# Patient Record
Sex: FEMALE | Race: BLACK OR AFRICAN AMERICAN | NOT HISPANIC OR LATINO | URBAN - METROPOLITAN AREA
[De-identification: names, ages, dates, MRNs, and addresses within clinical notes are randomized per-mention and may not be internally consistent; named-entity substitution may affect disease eponyms.]

---

## 2017-10-22 ENCOUNTER — EMERGENCY (EMERGENCY)
Facility: HOSPITAL | Age: 22
LOS: 1 days | Discharge: LEFT BEFORE DISCHARGE | End: 2017-10-22
Admitting: EMERGENCY MEDICINE

## 2017-10-22 ENCOUNTER — EMERGENCY (EMERGENCY)
Facility: HOSPITAL | Age: 22
LOS: 1 days | Discharge: HOME | End: 2017-10-22
Attending: EMERGENCY MEDICINE | Admitting: EMERGENCY MEDICINE
Payer: MEDICAID

## 2017-10-22 VITALS
RESPIRATION RATE: 16 BRPM | OXYGEN SATURATION: 100 % | SYSTOLIC BLOOD PRESSURE: 125 MMHG | DIASTOLIC BLOOD PRESSURE: 62 MMHG | HEART RATE: 75 BPM

## 2017-10-22 VITALS — HEART RATE: 63 BPM | RESPIRATION RATE: 16 BRPM | DIASTOLIC BLOOD PRESSURE: 60 MMHG | SYSTOLIC BLOOD PRESSURE: 131 MMHG

## 2017-10-22 VITALS
RESPIRATION RATE: 16 BRPM | TEMPERATURE: 98 F | SYSTOLIC BLOOD PRESSURE: 114 MMHG | OXYGEN SATURATION: 100 % | HEART RATE: 104 BPM | DIASTOLIC BLOOD PRESSURE: 60 MMHG

## 2017-10-22 LAB
ALBUMIN SERPL ELPH-MCNC: 4.5 G/DL — SIGNIFICANT CHANGE UP (ref 3.3–5)
ALP SERPL-CCNC: 68 U/L — SIGNIFICANT CHANGE UP (ref 40–120)
ALT FLD-CCNC: 10 U/L — SIGNIFICANT CHANGE UP (ref 4–33)
APPEARANCE UR: SIGNIFICANT CHANGE UP
AST SERPL-CCNC: 13 U/L — SIGNIFICANT CHANGE UP (ref 4–32)
BASOPHILS # BLD AUTO: 0.02 K/UL — SIGNIFICANT CHANGE UP (ref 0–0.2)
BASOPHILS NFR BLD AUTO: 0.1 % — SIGNIFICANT CHANGE UP (ref 0–2)
BILIRUB SERPL-MCNC: 0.5 MG/DL — SIGNIFICANT CHANGE UP (ref 0.2–1.2)
BILIRUB UR-MCNC: NEGATIVE — SIGNIFICANT CHANGE UP
BLOOD UR QL VISUAL: HIGH
BUN SERPL-MCNC: 8 MG/DL — SIGNIFICANT CHANGE UP (ref 7–23)
CALCIUM SERPL-MCNC: 8.6 MG/DL — SIGNIFICANT CHANGE UP (ref 8.4–10.5)
CHLORIDE SERPL-SCNC: 100 MMOL/L — SIGNIFICANT CHANGE UP (ref 98–107)
CO2 SERPL-SCNC: 24 MMOL/L — SIGNIFICANT CHANGE UP (ref 22–31)
COLOR SPEC: YELLOW — SIGNIFICANT CHANGE UP
CREAT SERPL-MCNC: 1.04 MG/DL — SIGNIFICANT CHANGE UP (ref 0.5–1.3)
EOSINOPHIL # BLD AUTO: 0.05 K/UL — SIGNIFICANT CHANGE UP (ref 0–0.5)
EOSINOPHIL NFR BLD AUTO: 0.3 % — SIGNIFICANT CHANGE UP (ref 0–6)
GLUCOSE SERPL-MCNC: 102 MG/DL — HIGH (ref 70–99)
GLUCOSE UR-MCNC: NEGATIVE — SIGNIFICANT CHANGE UP
HCT VFR BLD CALC: 37.3 % — SIGNIFICANT CHANGE UP (ref 34.5–45)
HGB BLD-MCNC: 12.4 G/DL — SIGNIFICANT CHANGE UP (ref 11.5–15.5)
HIV1 AG SER QL: SIGNIFICANT CHANGE UP
HIV1+2 AB SPEC QL: SIGNIFICANT CHANGE UP
IMM GRANULOCYTES # BLD AUTO: 0.06 # — SIGNIFICANT CHANGE UP
IMM GRANULOCYTES NFR BLD AUTO: 0.4 % — SIGNIFICANT CHANGE UP (ref 0–1.5)
KETONES UR-MCNC: NEGATIVE — SIGNIFICANT CHANGE UP
LEUKOCYTE ESTERASE UR-ACNC: HIGH
LIDOCAIN IGE QN: 17.3 U/L — SIGNIFICANT CHANGE UP (ref 7–60)
LYMPHOCYTES # BLD AUTO: 0.74 K/UL — LOW (ref 1–3.3)
LYMPHOCYTES # BLD AUTO: 4.8 % — LOW (ref 13–44)
MCHC RBC-ENTMCNC: 29.4 PG — SIGNIFICANT CHANGE UP (ref 27–34)
MCHC RBC-ENTMCNC: 33.2 % — SIGNIFICANT CHANGE UP (ref 32–36)
MCV RBC AUTO: 88.4 FL — SIGNIFICANT CHANGE UP (ref 80–100)
MONOCYTES # BLD AUTO: 0.62 K/UL — SIGNIFICANT CHANGE UP (ref 0–0.9)
MONOCYTES NFR BLD AUTO: 4 % — SIGNIFICANT CHANGE UP (ref 2–14)
MUCOUS THREADS # UR AUTO: SIGNIFICANT CHANGE UP
NEUTROPHILS # BLD AUTO: 13.96 K/UL — HIGH (ref 1.8–7.4)
NEUTROPHILS NFR BLD AUTO: 90.4 % — HIGH (ref 43–77)
NITRITE UR-MCNC: NEGATIVE — SIGNIFICANT CHANGE UP
NRBC # FLD: 0 — SIGNIFICANT CHANGE UP
PH UR: 6 — SIGNIFICANT CHANGE UP (ref 4.6–8)
PLATELET # BLD AUTO: 236 K/UL — SIGNIFICANT CHANGE UP (ref 150–400)
PMV BLD: 11.4 FL — SIGNIFICANT CHANGE UP (ref 7–13)
POTASSIUM SERPL-MCNC: 3.2 MMOL/L — LOW (ref 3.5–5.3)
POTASSIUM SERPL-SCNC: 3.2 MMOL/L — LOW (ref 3.5–5.3)
PROT SERPL-MCNC: 7.7 G/DL — SIGNIFICANT CHANGE UP (ref 6–8.3)
PROT UR-MCNC: 30 — HIGH
RBC # BLD: 4.22 M/UL — SIGNIFICANT CHANGE UP (ref 3.8–5.2)
RBC # FLD: 11.9 % — SIGNIFICANT CHANGE UP (ref 10.3–14.5)
RBC CASTS # UR COMP ASSIST: SIGNIFICANT CHANGE UP (ref 0–?)
SODIUM SERPL-SCNC: 139 MMOL/L — SIGNIFICANT CHANGE UP (ref 135–145)
SP GR SPEC: 1.03 — SIGNIFICANT CHANGE UP (ref 1–1.03)
SQUAMOUS # UR AUTO: SIGNIFICANT CHANGE UP
UROBILINOGEN FLD QL: NORMAL E.U. — SIGNIFICANT CHANGE UP (ref 0.1–0.2)
WBC # BLD: 15.45 K/UL — HIGH (ref 3.8–10.5)
WBC # FLD AUTO: 15.45 K/UL — HIGH (ref 3.8–10.5)
WBC UR QL: >50 — HIGH (ref 0–?)

## 2017-10-22 PROCEDURE — 76830 TRANSVAGINAL US NON-OB: CPT | Mod: 26

## 2017-10-22 PROCEDURE — 99284 EMERGENCY DEPT VISIT MOD MDM: CPT

## 2017-10-22 RX ORDER — METRONIDAZOLE 500 MG
500 TABLET ORAL ONCE
Qty: 0 | Refills: 0 | Status: COMPLETED | OUTPATIENT
Start: 2017-10-22 | End: 2017-10-22

## 2017-10-22 RX ORDER — METRONIDAZOLE 500 MG
1 TABLET ORAL
Qty: 27 | Refills: 0
Start: 2017-10-22 | End: 2017-11-05

## 2017-10-22 RX ORDER — ACETAMINOPHEN 500 MG
975 TABLET ORAL ONCE
Qty: 0 | Refills: 0 | Status: COMPLETED | OUTPATIENT
Start: 2017-10-22 | End: 2017-10-22

## 2017-10-22 RX ORDER — CEFTRIAXONE 500 MG/1
250 INJECTION, POWDER, FOR SOLUTION INTRAMUSCULAR; INTRAVENOUS ONCE
Qty: 0 | Refills: 0 | Status: COMPLETED | OUTPATIENT
Start: 2017-10-22 | End: 2017-10-22

## 2017-10-22 RX ADMIN — Medication 100 MILLIGRAM(S): at 08:06

## 2017-10-22 RX ADMIN — Medication 975 MILLIGRAM(S): at 06:58

## 2017-10-22 RX ADMIN — Medication 500 MILLIGRAM(S): at 08:07

## 2017-10-22 RX ADMIN — Medication 975 MILLIGRAM(S): at 08:10

## 2017-10-22 RX ADMIN — CEFTRIAXONE 250 MILLIGRAM(S): 500 INJECTION, POWDER, FOR SOLUTION INTRAMUSCULAR; INTRAVENOUS at 08:10

## 2017-10-22 NOTE — ED ADULT TRIAGE NOTE - CHIEF COMPLAINT QUOTE
Pt comes with complaints of  generalized abdominal pain which began last evening, denies N/V/D denies all PMH, LMP 9/10 pt states that she took plan B and "thinks period is messed up now, pt able to tolerate fluids but feels full when it comes to food, last BM, yesterday (normal) pt admits to marijuana use this evening

## 2017-10-22 NOTE — ED PROVIDER NOTE - PROGRESS NOTE DETAILS
MAGI Dumont: Received sign out from Dr. Riggins to f/u on TVUS and tx for PID/UTI. US wnl. Discussed results with patient. Ok with plan for GYN follow up.

## 2017-10-22 NOTE — ED ADULT NURSE NOTE - OBJECTIVE STATEMENT
Pt received in room 12, a/o x3. Pt comes in for c/o generalized non-radiation abd pain that began yesterday morning. Pt reporting possibilty of STD and Pt received in room 12, a/o x3. Pt comes in for c/o generalized non-radiation abd pain that began yesterday morning. Pt reporting possibility of STD vs UTI, and came in for increasing abd ban. Iv access placed to Lac 20 G. Pt currently awaiting furthers MD evaluation and US. Pt reports hx of BV, took BV cream for symptoms as well and medication cream for yeast infection, will monitor

## 2017-10-22 NOTE — ED PROVIDER NOTE - CARE PLAN
Principal Discharge DX:	PID (pelvic inflammatory disease)  Instructions for follow-up, activity and diet:	Follow up with your PMD within 48-72 hours. Follow up with your gyn this week or follow up in our clinic, call [phone] to make an appointment. Increase fluids. Motrin 600mg every 8 hours with food for pain. Take Flagyl 500mg twice a day and Doxycycline 100mg twice a day for 14 days- take full course of antibiotics. DO NOT DRINK ALCOHOL WHEN TAKING THESE MEDICATIONS. May call the Admin PA for you results from 9-3, 7 days a week, at [phone]. Worsening pain, new fever, chills, nausea, vomiting return to ER  Secondary Diagnosis:	UTI (urinary tract infection)

## 2017-10-22 NOTE — ED PROVIDER NOTE - ATTENDING CONTRIBUTION TO CARE
Pt was seen and evaluated by me. Pt states over the past 2 days having suprapubic abd pain with some dysuria and discharge. Pt denies any fever, chills, SOB, nausea, vomiting, or diarrhea. Pt admits to recurrent BV infections. Lungs CTA b/l. RRR. Abd soft with mild suprapubic tenderness.

## 2017-10-22 NOTE — ED PROVIDER NOTE - OBJECTIVE STATEMENT
23 y/o female w/ no pmh p/w abdominal pain. symptoms started yesterday, described as sharp, non-radiating, upper and lower abdomen, waxing and waning. no fever, chills, nausea or vomiting. Patient feels as if she is not empying her bladder completely. Recently diagnosed w/ BV and took flagyl and tirconazole for yeast infection  LMP sept 22nd 21 y/o female w/ no pmh p/w abdominal pain. symptoms started yesterday, described as sharp, non-radiating, upper and lower abdomen, waxing and waning. no fever, chills, nausea or vomiting. Patient feels as if she is not emptying her bladder completely. Recently diagnosed w/ BV and took flagyl and tirconazole for yeast infection  LMP sept 22nd 21 y/o female w/ no pmh p/w abdominal pain. symptoms started yesterday, described as sharp, non-radiating, upper and lower abdomen, waxing and waning. no fever, chills, nausea or vomiting. Patient feels as if she is not emptying her bladder completely. Recently diagnosed w/ BV and took flagyl and terconazole for yeast infection LMP sept 22nd

## 2017-10-22 NOTE — ED PROVIDER NOTE - PLAN OF CARE
Follow up with your PMD within 48-72 hours. Follow up with your gyn this week or follow up in our clinic, call [phone] to make an appointment. Increase fluids. Motrin 600mg every 8 hours with food for pain. Take Flagyl 500mg twice a day and Doxycycline 100mg twice a day for 14 days- take full course of antibiotics. DO NOT DRINK ALCOHOL WHEN TAKING THESE MEDICATIONS. May call the Admin PA for you results from 9-3, 7 days a week, at [phone]. Worsening pain, new fever, chills, nausea, vomiting return to ER

## 2017-10-23 LAB
BACTERIA UR CULT: SIGNIFICANT CHANGE UP
C TRACH RRNA SPEC QL NAA+PROBE: SIGNIFICANT CHANGE UP
N GONORRHOEA RRNA SPEC QL NAA+PROBE: DETECTED — SIGNIFICANT CHANGE UP
SPECIMEN SOURCE: SIGNIFICANT CHANGE UP
SPECIMEN SOURCE: SIGNIFICANT CHANGE UP

## 2017-10-24 NOTE — ED POST DISCHARGE NOTE - RESULT SUMMARY
GC: detected. Patient treated in ED with Ceftriaxone 250mg IM X 1 and Patient discharged home with a prescription for Doxycline. Patient contact # [phone] Msg left with Admin # and hrs. Plan discussed with patient positive GC, have patient follow up with GYN and notify all sexual partners.

## 2018-09-24 NOTE — ED ADULT NURSE NOTE - CINV DISCH TEACH PARTICIP
Partially impaired: cannot see medication labels or newsprint, but can see obstacles in path, and the surrounding layout; can count fingers at arm's length Patient

## 2019-01-08 ENCOUNTER — EMERGENCY (EMERGENCY)
Facility: HOSPITAL | Age: 24
LOS: 1 days | Discharge: HOME | End: 2019-01-08
Attending: EMERGENCY MEDICINE | Admitting: EMERGENCY MEDICINE
Payer: MEDICAID

## 2019-01-08 VITALS
DIASTOLIC BLOOD PRESSURE: 96 MMHG | SYSTOLIC BLOOD PRESSURE: 135 MMHG | HEART RATE: 90 BPM | RESPIRATION RATE: 16 BRPM | TEMPERATURE: 101 F | OXYGEN SATURATION: 96 %

## 2019-01-08 PROCEDURE — 99283 EMERGENCY DEPT VISIT LOW MDM: CPT

## 2019-01-08 RX ORDER — ACETAMINOPHEN 500 MG
650 TABLET ORAL ONCE
Qty: 0 | Refills: 0 | Status: COMPLETED | OUTPATIENT
Start: 2019-01-08 | End: 2019-01-08

## 2019-01-08 RX ORDER — ACETAMINOPHEN 500 MG
1 TABLET ORAL
Qty: 16 | Refills: 0
Start: 2019-01-08 | End: 2019-01-11

## 2019-01-08 RX ORDER — ONDANSETRON 8 MG/1
4 TABLET, FILM COATED ORAL ONCE
Qty: 0 | Refills: 0 | Status: COMPLETED | OUTPATIENT
Start: 2019-01-08 | End: 2019-01-08

## 2019-01-08 RX ORDER — IBUPROFEN 200 MG
600 TABLET ORAL ONCE
Qty: 0 | Refills: 0 | Status: DISCONTINUED | OUTPATIENT
Start: 2019-01-08 | End: 2019-01-12

## 2019-01-08 RX ADMIN — ONDANSETRON 4 MILLIGRAM(S): 8 TABLET, FILM COATED ORAL at 16:53

## 2019-01-08 RX ADMIN — Medication 650 MILLIGRAM(S): at 16:53

## 2019-01-08 NOTE — ED PROVIDER NOTE - NS ED ROS FT
General: + fevers  HENT: + nasal congestion, sore throat, rhinorrhea  Eyes: denies photophobia  Neck: denies neck stiffness  CV: denies chest pain  Resp: denies difficulty breathing, + cough  Abdominal: + nausea, + vomiting, denies abdominal pain  MSK: + generalized body aches  Neuro: denies headaches

## 2019-01-08 NOTE — ED PROVIDER NOTE - MEDICAL DECISION MAKING DETAILS
23F w/ no PMH p/w viral illness, no nuchal rigidity, no neuro deficits, low suspicion for meningitis, no findings on lung auscultation, low suspicion for PNA, no focal abdominal findings, low suspicion for acute abdomen, no dysuria, low suspicion for UTI, will treat symptomatically

## 2019-01-08 NOTE — ED PROVIDER NOTE - ATTENDING CONTRIBUTION TO CARE
23F w/ no PMH p/w 2 days of fever, sore throat, URI symptoms, nausea, NBNB vomiting x1, gradual onset headache, generalized body aches, 23F w/ no PMH p/w 2 days of fever, sore throat, clear rhinorrhea, nasl congestion, nausea, vomiting x1, gradual onset headache, generalized body aches. headche resolves with Antipyretic's, rated mild at this time.  Dry cough. Denies any chest pain or shortness of breath.  Subjective fevers. No measured temperature. No neck pain, no neck stiffness, no photophobia, no abdominal pain, no urinary complaints, no rash.  PE  well appearing, non-toxic,  oropharynx erythematous without tonsillar or enlargement. No tender cervical adenopathy.  Lungs CTA. Abdomen soft and nontender palpation. No rash. Entirely neuro- intact.    s/s  Most consistent with viral upper respiratory infection. Lungs are clear to auscultation, oxygen sats normal, nonproductive cough, no s/s  or HPI findings to suggest pneumonia at this time.  no indication for chest x-ray at this time. no s/s Of meningitis. Patient was given Tylenol, ibuprofen, and Zofran in the ER. On patient reevaluation  patient reported feeling significantly better. Eating and drinking without any vomiting. Ambulating around the ER.  Patient is outside of the treatment window for influenza. Patient was discharged with instructions to  Drink plenty of fluids, Motrin and Tylenol for fever , and follow up with PMD in 1-2 days for repeat evaluation and further management.    I reviewed all results from this ED visit, and discussed ALL results with the patient and/or family, including all abnormal results and incidental findings. All questions/concerns were addressed, and I recommended appropriate follow up for all findings. All discharge instructions were thoroughly discussed with the patient and/or family, as well as important warning signs and new/ worsening symptoms which should necessitate patient's immediate return to the ED. The patient expressed understanding of all results discussed and follow up instructions given.The patient was agreeable with discharge and was discharged from the ED in stable condition.

## 2019-01-08 NOTE — ED ADULT TRIAGE NOTE - CHIEF COMPLAINT QUOTE
Patient c/o cough, sneezing, nausea, chest pain, body aches and fever since yesterday. Febrile in triage = 101.3

## 2019-01-08 NOTE — ED PROVIDER NOTE - OBJECTIVE STATEMENT
23F w/ no PMH p/w 2 days of fever, sore throat, URI symptoms, nausea, NBNB vomiting x1, gradual onset headache, generalized body aches, and decreased PO intake. Denies neck stiffness, photophobia, focal abdominal pain, productive cough, dysuria, vaginal discharge.

## 2019-01-08 NOTE — ED PROVIDER NOTE - NSFOLLOWUPINSTRUCTIONS_ED_ALL_ED_FT
You were seen for fever, body aches, headache, nausea, and other symptoms of a viral illness. At this time it does not appear you have an acute emergent infection. Please remember to stay hydrated. Take Tylenol every 6 hours as prescribed. Follow up with your primary care doctor in 3-4 days for follow up. Return to the ED if you have worsening symptoms, are unable to eat or drink, have worsening headache, have neck stiffness, worsening abdominal pain, or other new symptoms

## 2019-01-08 NOTE — ED PROVIDER NOTE - PHYSICAL EXAMINATION
CONSTITUTIONAL: awake, alert, no acute respiratory distress  HEAD: Normocephalic; atraumatic  EYES: EOMI, no nystagmus  ENMT: External appears normal; no tonsillar exudates, no pharyngeal erythema  NECK: Supple; no nuchal rigidity,   CARD: Normal Sl, S2; no audible murmurs  RESP: Breathing comfortably on RA, ctab, no rales, no crackles   ABD: Soft, non-distended; no focal tenderness  EXT: No pedal edema  SKIN: Warm, dry, no rashes  NEURO: aaox3, moving all extremities spontaneously

## 2019-09-08 ENCOUNTER — EMERGENCY (EMERGENCY)
Facility: HOSPITAL | Age: 24
LOS: 1 days | Discharge: HOME | End: 2019-09-08
Attending: EMERGENCY MEDICINE | Admitting: EMERGENCY MEDICINE
Payer: MEDICAID

## 2019-09-08 VITALS
SYSTOLIC BLOOD PRESSURE: 132 MMHG | TEMPERATURE: 98 F | DIASTOLIC BLOOD PRESSURE: 68 MMHG | OXYGEN SATURATION: 99 % | RESPIRATION RATE: 17 BRPM | HEART RATE: 61 BPM

## 2019-09-08 VITALS
SYSTOLIC BLOOD PRESSURE: 131 MMHG | TEMPERATURE: 98 F | OXYGEN SATURATION: 100 % | HEART RATE: 56 BPM | RESPIRATION RATE: 16 BRPM | DIASTOLIC BLOOD PRESSURE: 77 MMHG

## 2019-09-08 LAB
ALBUMIN SERPL ELPH-MCNC: 4.6 G/DL — SIGNIFICANT CHANGE UP (ref 3.3–5)
ALP SERPL-CCNC: 62 U/L — SIGNIFICANT CHANGE UP (ref 40–120)
ALT FLD-CCNC: 11 U/L — SIGNIFICANT CHANGE UP (ref 4–33)
ANION GAP SERPL CALC-SCNC: 11 MMO/L — SIGNIFICANT CHANGE UP (ref 7–14)
APPEARANCE UR: CLEAR — SIGNIFICANT CHANGE UP
AST SERPL-CCNC: 18 U/L — SIGNIFICANT CHANGE UP (ref 4–32)
BACTERIA # UR AUTO: NEGATIVE — SIGNIFICANT CHANGE UP
BASOPHILS # BLD AUTO: 0.03 K/UL — SIGNIFICANT CHANGE UP (ref 0–0.2)
BASOPHILS NFR BLD AUTO: 0.6 % — SIGNIFICANT CHANGE UP (ref 0–2)
BILIRUB SERPL-MCNC: 0.2 MG/DL — SIGNIFICANT CHANGE UP (ref 0.2–1.2)
BILIRUB UR-MCNC: NEGATIVE — SIGNIFICANT CHANGE UP
BLOOD UR QL VISUAL: HIGH
BUN SERPL-MCNC: 13 MG/DL — SIGNIFICANT CHANGE UP (ref 7–23)
CALCIUM SERPL-MCNC: 9.4 MG/DL — SIGNIFICANT CHANGE UP (ref 8.4–10.5)
CHLORIDE SERPL-SCNC: 104 MMOL/L — SIGNIFICANT CHANGE UP (ref 98–107)
CO2 SERPL-SCNC: 25 MMOL/L — SIGNIFICANT CHANGE UP (ref 22–31)
COLOR SPEC: SIGNIFICANT CHANGE UP
CREAT SERPL-MCNC: 0.91 MG/DL — SIGNIFICANT CHANGE UP (ref 0.5–1.3)
EOSINOPHIL # BLD AUTO: 0.06 K/UL — SIGNIFICANT CHANGE UP (ref 0–0.5)
EOSINOPHIL NFR BLD AUTO: 1.1 % — SIGNIFICANT CHANGE UP (ref 0–6)
GLUCOSE SERPL-MCNC: 86 MG/DL — SIGNIFICANT CHANGE UP (ref 70–99)
GLUCOSE UR-MCNC: NEGATIVE — SIGNIFICANT CHANGE UP
HCT VFR BLD CALC: 37 % — SIGNIFICANT CHANGE UP (ref 34.5–45)
HGB BLD-MCNC: 12.1 G/DL — SIGNIFICANT CHANGE UP (ref 11.5–15.5)
HIV COMBO RESULT: SIGNIFICANT CHANGE UP
HIV1+2 AB SPEC QL: SIGNIFICANT CHANGE UP
HYALINE CASTS # UR AUTO: NEGATIVE — SIGNIFICANT CHANGE UP
IMM GRANULOCYTES NFR BLD AUTO: 0.2 % — SIGNIFICANT CHANGE UP (ref 0–1.5)
KETONES UR-MCNC: NEGATIVE — SIGNIFICANT CHANGE UP
LEUKOCYTE ESTERASE UR-ACNC: NEGATIVE — SIGNIFICANT CHANGE UP
LIDOCAIN IGE QN: 26.6 U/L — SIGNIFICANT CHANGE UP (ref 7–60)
LYMPHOCYTES # BLD AUTO: 2.24 K/UL — SIGNIFICANT CHANGE UP (ref 1–3.3)
LYMPHOCYTES # BLD AUTO: 41.9 % — SIGNIFICANT CHANGE UP (ref 13–44)
MCHC RBC-ENTMCNC: 29.2 PG — SIGNIFICANT CHANGE UP (ref 27–34)
MCHC RBC-ENTMCNC: 32.7 % — SIGNIFICANT CHANGE UP (ref 32–36)
MCV RBC AUTO: 89.2 FL — SIGNIFICANT CHANGE UP (ref 80–100)
MONOCYTES # BLD AUTO: 0.62 K/UL — SIGNIFICANT CHANGE UP (ref 0–0.9)
MONOCYTES NFR BLD AUTO: 11.6 % — SIGNIFICANT CHANGE UP (ref 2–14)
NEUTROPHILS # BLD AUTO: 2.39 K/UL — SIGNIFICANT CHANGE UP (ref 1.8–7.4)
NEUTROPHILS NFR BLD AUTO: 44.6 % — SIGNIFICANT CHANGE UP (ref 43–77)
NITRITE UR-MCNC: NEGATIVE — SIGNIFICANT CHANGE UP
NRBC # FLD: 0 K/UL — SIGNIFICANT CHANGE UP (ref 0–0)
PH UR: 6.5 — SIGNIFICANT CHANGE UP (ref 5–8)
PLATELET # BLD AUTO: 282 K/UL — SIGNIFICANT CHANGE UP (ref 150–400)
PMV BLD: 10.4 FL — SIGNIFICANT CHANGE UP (ref 7–13)
POTASSIUM SERPL-MCNC: 4.4 MMOL/L — SIGNIFICANT CHANGE UP (ref 3.5–5.3)
POTASSIUM SERPL-SCNC: 4.4 MMOL/L — SIGNIFICANT CHANGE UP (ref 3.5–5.3)
PROT SERPL-MCNC: 7.6 G/DL — SIGNIFICANT CHANGE UP (ref 6–8.3)
PROT UR-MCNC: 30 — SIGNIFICANT CHANGE UP
RBC # BLD: 4.15 M/UL — SIGNIFICANT CHANGE UP (ref 3.8–5.2)
RBC # FLD: 11.6 % — SIGNIFICANT CHANGE UP (ref 10.3–14.5)
RBC CASTS # UR COMP ASSIST: >50 — HIGH (ref 0–?)
SODIUM SERPL-SCNC: 140 MMOL/L — SIGNIFICANT CHANGE UP (ref 135–145)
SP GR SPEC: 1.02 — SIGNIFICANT CHANGE UP (ref 1–1.04)
SQUAMOUS # UR AUTO: SIGNIFICANT CHANGE UP
UROBILINOGEN FLD QL: NORMAL — SIGNIFICANT CHANGE UP
WBC # BLD: 5.35 K/UL — SIGNIFICANT CHANGE UP (ref 3.8–10.5)
WBC # FLD AUTO: 5.35 K/UL — SIGNIFICANT CHANGE UP (ref 3.8–10.5)
WBC UR QL: SIGNIFICANT CHANGE UP (ref 0–?)

## 2019-09-08 PROCEDURE — 99283 EMERGENCY DEPT VISIT LOW MDM: CPT

## 2019-09-08 RX ORDER — FLUCONAZOLE 150 MG/1
150 TABLET ORAL ONCE
Refills: 0 | Status: COMPLETED | OUTPATIENT
Start: 2019-09-08 | End: 2019-09-08

## 2019-09-08 RX ORDER — ACETAMINOPHEN 500 MG
650 TABLET ORAL ONCE
Refills: 0 | Status: COMPLETED | OUTPATIENT
Start: 2019-09-08 | End: 2019-09-08

## 2019-09-08 RX ADMIN — FLUCONAZOLE 150 MILLIGRAM(S): 150 TABLET ORAL at 21:19

## 2019-09-08 RX ADMIN — Medication 650 MILLIGRAM(S): at 19:23

## 2019-09-08 NOTE — ED ADULT NURSE REASSESSMENT NOTE - NS ED NURSE REASSESS COMMENT FT1
received pt A&Ox3 absent any distress or C/O pain. able to make needs know with care provided PRN. will continue with current plan of care PT went to [employer] to get food. PT appears comfortable and in no acute distress.

## 2019-09-08 NOTE — ED ADULT NURSE NOTE - OBJECTIVE STATEMENT
patient  Alert & ox3. patient complains of left side lower abdominal pain and urinary frequency.pt . evaluated by MD.Placed 20g angiocath Lt. AC., labs drawn and sent. patient will be waiting for results, further evaluation and disposition.  made comfortable.will continue to monitor.
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
## 2019-09-08 NOTE — ED PROVIDER NOTE - NSFOLLOWUPINSTRUCTIONS_ED_ALL_ED_FT
FOLLOW UP WITH YOUR  DOCTOR WITHIN 1 WEEK  BRING THE COPIES OF YOUR RESULTS WITH YOU (PROVIDED)  CAN TAKE TYLENOL 650MG ORALLY EVERY 6 HOURS FOR PAIN OR FEVER.  IBUPROFEN 400MG ORALLY EVERY 6 HOURS FOR PAIN OR FEVER.    CAN TAKE TYLENOL AND IBUPROFEN AT THE SAME TIME  RETURN TO ED FOR NEW OR WORSENING SYMPTOMS.    Follow up with [facility] - HAND clinic - [phone].  Or follow up with your Medical Doctor for referral.

## 2019-09-08 NOTE — ED PROVIDER NOTE - PHYSICAL EXAMINATION
VS:  unremarkable    GEN - NAD; well appearing; A+O x3   HEAD - NC/AT   healing forehead sutured lac (no sutures remain).  ENT - PEERL, EOMI, mucous membranes  moist , no discharge      NECK: Neck supple, non-tender without lymphadenopathy, no masses, no JVD  PULM - CTA b/l,  symmetric breath sounds  COR -  normal heart sounds    ABD - , ND, NT, soft,  pelvic exam - chap RN Ellie - normal ext genitalia.  No discharge.  small amount of blood in vaginal canal.  Os closed.  No adnexal ttp.   BACK - no CVA tenderness, nontender spine     EXTREMS - no edema, no deformity, warm and well perfused    SKIN - no rash or bruising      NEUROLOGIC - alert, CN 2-12 intact, sensation nl, motor no focal deficit.

## 2019-09-08 NOTE — ED PROVIDER NOTE - PATIENT PORTAL LINK FT
You can access the FollowMyHealth Patient Portal offered by [facility] by registering at the following website: [email or web address]. By joining [employer]’s FollowMyHealth portal, you will also be able to view your health information using other applications (apps) compatible with our system.
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
## 2019-09-08 NOTE — ED PROVIDER NOTE - NS ED ROS FT
L hand pain  pelvic irritation  vaginal bleeding  Urinary frequency  all other ROS negative except as per HPI

## 2019-09-09 LAB
C TRACH RRNA SPEC QL NAA+PROBE: SIGNIFICANT CHANGE UP
N GONORRHOEA RRNA SPEC QL NAA+PROBE: SIGNIFICANT CHANGE UP
SPECIMEN SOURCE: SIGNIFICANT CHANGE UP

## 2019-09-10 DIAGNOSIS — Z98.890 OTHER SPECIFIED POSTPROCEDURAL STATES: Chronic | ICD-10-CM

## 2019-09-10 LAB
BACTERIA UR CULT: SIGNIFICANT CHANGE UP
SPECIMEN SOURCE: SIGNIFICANT CHANGE UP

## 2019-10-31 ENCOUNTER — EMERGENCY (EMERGENCY)
Facility: HOSPITAL | Age: 24
LOS: 1 days | Discharge: HOME | End: 2019-10-31
Attending: EMERGENCY MEDICINE | Admitting: EMERGENCY MEDICINE
Payer: COMMERCIAL

## 2019-10-31 VITALS
DIASTOLIC BLOOD PRESSURE: 55 MMHG | TEMPERATURE: 98 F | RESPIRATION RATE: 16 BRPM | OXYGEN SATURATION: 100 % | HEART RATE: 70 BPM | SYSTOLIC BLOOD PRESSURE: 112 MMHG

## 2019-10-31 DIAGNOSIS — Z98.890 OTHER SPECIFIED POSTPROCEDURAL STATES: Chronic | ICD-10-CM

## 2019-10-31 PROCEDURE — 99283 EMERGENCY DEPT VISIT LOW MDM: CPT

## 2019-10-31 RX ORDER — IBUPROFEN 200 MG
1 TABLET ORAL
Qty: 16 | Refills: 0
Start: 2019-10-31 | End: 2019-11-03

## 2019-10-31 RX ORDER — ACETAMINOPHEN 500 MG
650 TABLET ORAL ONCE
Refills: 0 | Status: COMPLETED | OUTPATIENT
Start: 2019-10-31 | End: 2019-10-31

## 2019-10-31 RX ORDER — IBUPROFEN 200 MG
600 TABLET ORAL ONCE
Refills: 0 | Status: COMPLETED | OUTPATIENT
Start: 2019-10-31 | End: 2019-10-31

## 2019-10-31 RX ORDER — LIDOCAINE 4 G/100G
1 CREAM TOPICAL ONCE
Refills: 0 | Status: COMPLETED | OUTPATIENT
Start: 2019-10-31 | End: 2019-10-31

## 2019-10-31 RX ORDER — CYCLOBENZAPRINE HYDROCHLORIDE 10 MG/1
1 TABLET, FILM COATED ORAL
Qty: 9 | Refills: 0
Start: 2019-10-31 | End: 2019-11-02

## 2019-10-31 RX ORDER — DIAZEPAM 5 MG
5 TABLET ORAL ONCE
Refills: 0 | Status: DISCONTINUED | OUTPATIENT
Start: 2019-10-31 | End: 2019-10-31

## 2019-10-31 RX ADMIN — Medication 5 MILLIGRAM(S): at 19:40

## 2019-10-31 RX ADMIN — Medication 650 MILLIGRAM(S): at 19:41

## 2019-10-31 RX ADMIN — Medication 600 MILLIGRAM(S): at 19:41

## 2019-10-31 RX ADMIN — LIDOCAINE 1 PATCH: 4 CREAM TOPICAL at 19:41

## 2019-10-31 NOTE — ED PROVIDER NOTE - NSFOLLOWUPINSTRUCTIONS_ED_ALL_ED_FT
Patient advised to follow up with PRIMARY CARE DOCTOR IN 1-2 DAYS AND SPINE SPECIALIST AS NEEDED  and told to return to the emergency department immediately for any new or concerning symptoms  OR ANY OTHER COMPLAINTS. Patient agrees with plan.    Rest, avoid heavy lifting/strenuous activity   Take ibuprofen 600 mg every 6 hrs as needed for pain   Take muscle relaxer as directed- no driving, drinking alcohol or operating machinery while taking      Advance activity as tolerated.  Continue all previously prescribed medications as directed unless otherwise instructed.  Follow up with your primary care physician in 48-72 hours- bring copies of your results.  Return to the ER for worsening or persistent symptoms, and/or ANY NEW OR CONCERNING SYMPTOMS. If you have issues obtaining follow up, please call: [phone] to obtain a doctor or specialist who takes your insurance in your area.  You may call [phone] to make an appointment with the internal medicine clinic.

## 2019-10-31 NOTE — ED PROVIDER NOTE - PATIENT PORTAL LINK FT
You can access the FollowMyHealth Patient Portal offered by [facility] by registering at the following website: [email or web address]. By joining [employer]’s FollowMyHealth portal, you will also be able to view your health information using other applications (apps) compatible with our system.
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
## 2019-10-31 NOTE — ED PROVIDER NOTE - PROGRESS NOTE DETAILS
PA De La Paz- Patient seen, evaluated and plan started by MD, Pt given meds with good relief, ambulatory in ED, feeling better and eager to be dc home. Pt given RICE instructions and IBU and muscle relaxer and advised to f/u with pcp 1-2 days or spine if continued sx. All questions and concerns addressed. Strict return instructions given.
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
## 2021-06-21 ENCOUNTER — EMERGENCY (EMERGENCY)
Facility: HOSPITAL | Age: 26
LOS: 1 days | Discharge: STILL ADMITTED OR ADMINISTRATIVE | End: 2021-06-21
Admitting: EMERGENCY MEDICINE
Payer: COMMERCIAL

## 2021-06-21 ENCOUNTER — OUTPATIENT (OUTPATIENT)
Dept: INPATIENT UNIT | Facility: HOSPITAL | Age: 26
LOS: 1 days | Discharge: HOME | End: 2021-06-21

## 2021-06-21 VITALS
TEMPERATURE: 98 F | OXYGEN SATURATION: 99 % | SYSTOLIC BLOOD PRESSURE: 126 MMHG | DIASTOLIC BLOOD PRESSURE: 82 MMHG | HEART RATE: 82 BPM | HEIGHT: 62 IN | RESPIRATION RATE: 18 BRPM

## 2021-06-21 DIAGNOSIS — Z98.890 OTHER SPECIFIED POSTPROCEDURAL STATES: Chronic | ICD-10-CM

## 2021-06-21 DIAGNOSIS — Z3A.00 WEEKS OF GESTATION OF PREGNANCY NOT SPECIFIED: ICD-10-CM

## 2021-06-21 DIAGNOSIS — O26.899 OTHER SPECIFIED PREGNANCY RELATED CONDITIONS, UNSPECIFIED TRIMESTER: ICD-10-CM

## 2021-06-21 PROCEDURE — L9993: CPT

## 2021-06-21 NOTE — ED ADULT TRIAGE NOTE - CHIEF COMPLAINT QUOTE
Pt. states she is 7 months pregnant, due on 8/26. C/o right sided abd pain, pelvic pain and nausea that began 4 hours ago. Denies vaginal bleeding/discharge. Spoke to Yue from L&D who states pt. to be seen in L&D.

## 2021-06-21 NOTE — ED ADULT NURSE NOTE - NS ED NURSE NOTE DISPO AOU DETAILS FT
Pt. A&Ox4, in no acute distress. Respirations even & unlabored. Spoke to Yue from L&D who states pt. to be seen in L&D, will be transported by ED tech.

## 2021-06-22 NOTE — OB RN TRIAGE NOTE - NS_TRIAGEADDITIONAL COMMENTS_OBGYN_ALL_OB_FT
Pt left with out being seen in [facility]. Pt does not want to wait any longer.

## 2021-07-19 ENCOUNTER — OUTPATIENT (OUTPATIENT)
Dept: OUTPATIENT SERVICES | Facility: HOSPITAL | Age: 26
LOS: 1 days | End: 2021-07-19
Payer: MEDICAID

## 2021-07-19 VITALS — DIASTOLIC BLOOD PRESSURE: 84 MMHG | HEART RATE: 64 BPM | TEMPERATURE: 99 F | SYSTOLIC BLOOD PRESSURE: 144 MMHG

## 2021-07-19 DIAGNOSIS — Z3A.00 WEEKS OF GESTATION OF PREGNANCY NOT SPECIFIED: ICD-10-CM

## 2021-07-19 DIAGNOSIS — O26.899 OTHER SPECIFIED PREGNANCY RELATED CONDITIONS, UNSPECIFIED TRIMESTER: ICD-10-CM

## 2021-07-19 DIAGNOSIS — Z98.890 OTHER SPECIFIED POSTPROCEDURAL STATES: Chronic | ICD-10-CM

## 2021-07-19 LAB
ALBUMIN SERPL ELPH-MCNC: 3.8 G/DL — SIGNIFICANT CHANGE UP (ref 3.3–5)
ALP SERPL-CCNC: 114 U/L — SIGNIFICANT CHANGE UP (ref 40–120)
ALT FLD-CCNC: 30 U/L — SIGNIFICANT CHANGE UP (ref 10–45)
ANION GAP SERPL CALC-SCNC: 13 MMOL/L — SIGNIFICANT CHANGE UP (ref 5–17)
APPEARANCE UR: CLEAR — SIGNIFICANT CHANGE UP
APTT BLD: 28.9 SEC — SIGNIFICANT CHANGE UP (ref 27.5–35.5)
AST SERPL-CCNC: 29 U/L — SIGNIFICANT CHANGE UP (ref 10–40)
BASOPHILS # BLD AUTO: 0.03 K/UL — SIGNIFICANT CHANGE UP (ref 0–0.2)
BASOPHILS NFR BLD AUTO: 0.5 % — SIGNIFICANT CHANGE UP (ref 0–2)
BILIRUB SERPL-MCNC: 0.2 MG/DL — SIGNIFICANT CHANGE UP (ref 0.2–1.2)
BILIRUB UR-MCNC: NEGATIVE — SIGNIFICANT CHANGE UP
BLD GP AB SCN SERPL QL: NEGATIVE — SIGNIFICANT CHANGE UP
BUN SERPL-MCNC: 9 MG/DL — SIGNIFICANT CHANGE UP (ref 7–23)
CALCIUM SERPL-MCNC: 9.3 MG/DL — SIGNIFICANT CHANGE UP (ref 8.4–10.5)
CHLORIDE SERPL-SCNC: 105 MMOL/L — SIGNIFICANT CHANGE UP (ref 96–108)
CO2 SERPL-SCNC: 21 MMOL/L — LOW (ref 22–31)
COLOR SPEC: SIGNIFICANT CHANGE UP
CREAT SERPL-MCNC: 0.7 MG/DL — SIGNIFICANT CHANGE UP (ref 0.5–1.3)
DIFF PNL FLD: NEGATIVE — SIGNIFICANT CHANGE UP
EOSINOPHIL # BLD AUTO: 0.05 K/UL — SIGNIFICANT CHANGE UP (ref 0–0.5)
EOSINOPHIL NFR BLD AUTO: 0.9 % — SIGNIFICANT CHANGE UP (ref 0–6)
FIBRINOGEN PPP-MCNC: 860 MG/DL — HIGH (ref 290–520)
GLUCOSE SERPL-MCNC: 72 MG/DL — SIGNIFICANT CHANGE UP (ref 70–99)
GLUCOSE UR QL: NEGATIVE — SIGNIFICANT CHANGE UP
HCT VFR BLD CALC: 32.2 % — LOW (ref 34.5–45)
HGB BLD-MCNC: 10.8 G/DL — LOW (ref 11.5–15.5)
IMM GRANULOCYTES NFR BLD AUTO: 0.5 % — SIGNIFICANT CHANGE UP (ref 0–1.5)
INR BLD: 0.94 RATIO — SIGNIFICANT CHANGE UP (ref 0.88–1.16)
KETONES UR-MCNC: NEGATIVE — SIGNIFICANT CHANGE UP
LDH SERPL L TO P-CCNC: 228 U/L — SIGNIFICANT CHANGE UP (ref 50–242)
LEUKOCYTE ESTERASE UR-ACNC: NEGATIVE — SIGNIFICANT CHANGE UP
LYMPHOCYTES # BLD AUTO: 1.04 K/UL — SIGNIFICANT CHANGE UP (ref 1–3.3)
LYMPHOCYTES # BLD AUTO: 18.9 % — SIGNIFICANT CHANGE UP (ref 13–44)
MCHC RBC-ENTMCNC: 29.5 PG — SIGNIFICANT CHANGE UP (ref 27–34)
MCHC RBC-ENTMCNC: 33.5 GM/DL — SIGNIFICANT CHANGE UP (ref 32–36)
MCV RBC AUTO: 88 FL — SIGNIFICANT CHANGE UP (ref 80–100)
MONOCYTES # BLD AUTO: 0.48 K/UL — SIGNIFICANT CHANGE UP (ref 0–0.9)
MONOCYTES NFR BLD AUTO: 8.7 % — SIGNIFICANT CHANGE UP (ref 2–14)
NEUTROPHILS # BLD AUTO: 3.88 K/UL — SIGNIFICANT CHANGE UP (ref 1.8–7.4)
NEUTROPHILS NFR BLD AUTO: 70.5 % — SIGNIFICANT CHANGE UP (ref 43–77)
NITRITE UR-MCNC: NEGATIVE — SIGNIFICANT CHANGE UP
NRBC # BLD: 0 /100 WBCS — SIGNIFICANT CHANGE UP (ref 0–0)
PH UR: 6.5 — SIGNIFICANT CHANGE UP (ref 5–8)
PLATELET # BLD AUTO: 249 K/UL — SIGNIFICANT CHANGE UP (ref 150–400)
POTASSIUM SERPL-MCNC: 4 MMOL/L — SIGNIFICANT CHANGE UP (ref 3.5–5.3)
POTASSIUM SERPL-SCNC: 4 MMOL/L — SIGNIFICANT CHANGE UP (ref 3.5–5.3)
PROT SERPL-MCNC: 6.7 G/DL — SIGNIFICANT CHANGE UP (ref 6–8.3)
PROT UR-MCNC: NEGATIVE — SIGNIFICANT CHANGE UP
PROTHROM AB SERPL-ACNC: 11.3 SEC — SIGNIFICANT CHANGE UP (ref 10.6–13.6)
RBC # BLD: 3.66 M/UL — LOW (ref 3.8–5.2)
RBC # FLD: 12.3 % — SIGNIFICANT CHANGE UP (ref 10.3–14.5)
RH IG SCN BLD-IMP: POSITIVE — SIGNIFICANT CHANGE UP
SARS-COV-2 RNA SPEC QL NAA+PROBE: SIGNIFICANT CHANGE UP
SODIUM SERPL-SCNC: 139 MMOL/L — SIGNIFICANT CHANGE UP (ref 135–145)
SP GR SPEC: 1.01 — SIGNIFICANT CHANGE UP (ref 1.01–1.02)
URATE SERPL-MCNC: 6 MG/DL — SIGNIFICANT CHANGE UP (ref 2.5–7)
UROBILINOGEN FLD QL: NEGATIVE — SIGNIFICANT CHANGE UP
WBC # BLD: 5.51 K/UL — SIGNIFICANT CHANGE UP (ref 3.8–10.5)
WBC # FLD AUTO: 5.51 K/UL — SIGNIFICANT CHANGE UP (ref 3.8–10.5)

## 2021-07-19 PROCEDURE — 99244 OFF/OP CNSLTJ NEW/EST MOD 40: CPT

## 2021-07-19 RX ORDER — SODIUM CHLORIDE 9 MG/ML
1000 INJECTION, SOLUTION INTRAVENOUS ONCE
Refills: 0 | Status: COMPLETED | OUTPATIENT
Start: 2021-07-19 | End: 2021-07-19

## 2021-07-19 RX ORDER — ACETAMINOPHEN 500 MG
975 TABLET ORAL ONCE
Refills: 0 | Status: COMPLETED | OUTPATIENT
Start: 2021-07-19 | End: 2021-07-19

## 2021-07-19 RX ADMIN — Medication 975 MILLIGRAM(S): at 20:44

## 2021-07-19 RX ADMIN — SODIUM CHLORIDE 1000 MILLILITER(S): 9 INJECTION, SOLUTION INTRAVENOUS at 19:45

## 2021-07-19 RX ADMIN — Medication 975 MILLIGRAM(S): at 21:00
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
## 2021-07-19 NOTE — OB PROVIDER TRIAGE NOTE - NSOBPROVIDERNOTE_OBGYN_ALL_OB_FT
A/P: 27 y/o  @34.4 wks GA here for evaluation for RLQ pain and diarrhea for 16 hours. Patient's symptoms are consistent with gastroenteritis vs UTI. Will perform workup and management to r/o both.     -Observe in Triage  -Labile BPs: HELLP labs pending  -RLQ pain and pelvic pain: CBC, CMP, UA pending  -IV fluid bolus  -Labor: Intact. Not in labor.   -Fetus: Reactive tracing. Continuous toco and fetal monitoring.   -GBS: unknown @34.4 weeks GA  -DVT ppx: Ambulate.    Discussed with Dr. Abhilash Leung, PGY1 A/P: 25 y/o  @34.4 wks GA here for evaluation for RLQ pain and diarrhea for 16 hours. Patient's symptoms are consistent with gastroenteritis vs UTI.  Labs wnl and UA neg.  During triage visit patient noted to have elevated BP consistent with diagnosis of gestational hypertension.  HELLP labs normal.     Plan:  -Continue to monitor BP  -P/C pending  -Fetus: Reactive tracing. Continuous toco and fetal monitoring.   -GBS: unknown @34.4 weeks GA    Patient seen w/ Dr. Abhilash Adamson, PGY3 A/P: 27 y/o  @34.4 wks GA here for evaluation for RLQ pain and diarrhea for 16 hours. Patient's symptoms are consistent with gastroenteritis vs UTI.  Labs wnl and UA neg.  During triage visit patient noted to have elevated BP.  HELLP labs normal.     Plan:  -Continue to monitor BP, r/o gHTN/PEC  -P/C pending  -Fetus: Reactive tracing. Continuous toco and fetal monitoring.   -GBS: unknown @34.4 weeks GA    Patient seen w/ Dr. Abhilash Adamson, PGY3 A/P: 25 y/o  @34.4 wks GA here for evaluation for RLQ pain and diarrhea for 16 hours. Patient's symptoms are consistent with gastroenteritis vs UTI.  Labs wnl and UA neg.  During triage visit patient noted to have elevated BPs consistent with diagnosis of gHTN.  HELLP labs normal.     Plan:  -Continue to monitor BP  -P/C pending  -Fetus: Reactive tracing. Continuous toco and fetal monitoring.   -GBS: unknown @34.4 weeks GA    Patient seen w/ Dr. Abhilash Adamson, PGY3 A/P: 25 y/o  @34.4 wks GA here for evaluation for RLQ pain and diarrhea for 16 hours. Patient's symptoms are consistent with gastroenteritis vs UTI.  Labs wnl and UA neg.  During triage visit patient noted to have elevated BPs consistent with diagnosis of gHTN.  HELLP labs normal.     Plan:  -Continue to monitor BP  -P/C pending  -Fetus: Reactive tracing. Continuous toco and fetal monitoring.   -GBS: unknown @34.4 weeks GA    Patient seen w/ Dr. Abhilash Adamson, PGY3      ATTG note    Read and agree with above.  Pt seen at the bedside    25 yo  @ 34.5 wks presents with RLQ Pain and diarrhea sxs x 2 days.  Denies ctxs/LOF/VB.  +FM.  No fevers or other associated sxs.  Onset began after possible food eaten on .  PNC with outside clinic but desires to transfer care and deliver with [facility].   In triage had elevated BPs > 4 hr making criteria for GHTN.      T(C): 36.5 (21 @ 05:16), Max: 37.0 (21 @ 18:12)  HR: 66 (21 @ 06:49) (50 - 110)  BP: 118/76 (20-21 @ 06:15) (112/53 - 157/87)  RR: 18 (20-21 @ 06:15) (18 - 18)  SpO2: 97% (21 @ 06:49) (87% - 100%)  FHT-Cat 1  [place]-had  frequent ctxs initially that responded to IVFH  VE-closed    HELLP labs sent.    CBC-5/10//249  Creat-0.7  LFTs-/30  Coags-nl    25 yo P0 @ 34.5 wks presented with RLQ pain and GI sxs c/w gastroenteritis - no signs of PTL, ctxs resolved with IVFH. Elevated BPs made criteria for GHTN with some 150's systolic and 101 diastolic. NOrmal HELLP labs and PC ratio. Cat I FHT.  -23 hr obs to monitor BPs for progression to severe range  -24 hr urine collection  -recommend for IOL/delivery at 37 wks  -to organize f/u for outpt testing and/or transfer care to [facility]   -[facility] input in MIL[place]RO Hung A/P: 27 y/o  @34.4 wks GA here for evaluation for RLQ pain and diarrhea for 16 hours. Patient's symptoms are consistent with gastroenteritis vs UTI.  Labs wnl and UA neg.  During triage visit patient noted to have elevated BPs consistent with diagnosis of gHTN.  HELLP labs normal.     Plan:  -Continue to monitor BP  -P/C pending  -Fetus: Reactive tracing. Continuous toco and fetal monitoring.   -GBS: unknown @34.4 weeks GA    Patient seen w/ Dr. Hung    ATTG note    Read and agree with above.  Pt seen at the bedside    27 yo  @ 34.5 wks presents with RLQ Pain and diarrhea sxs x 2 days.  Denies ctxs/LOF/VB.  +FM.  No fevers or other associated sxs.  Onset began after possible food eaten on .  PNC with outside clinic but desires to transfer care and deliver with [facility].   In triage had elevated BPs > 4 hr making criteria for GHTN.      T(C): 36.5 (-21 @ 05:16), Max: 37.0 (-21 @ 18:12)  HR: 66 (20-21 @ 06:49) (50 - 110)  BP: 118/76 (-20-21 @ 06:15) (112/53 - 157/87)  RR: 18 (-20-21 @ 06:15) (18 - 18)  SpO2: 97% (20-21 @ 06:49) (87% - 100%)  FHT-Cat 1  [place]-had  frequent ctxs initially that responded to IVFH  VE-closed    HELLP labs sent.    CBC-5/10/32/249  Creat-0.7  LFTs-30  Coags-nl    27 yo P0 @ 34.5 wks presented with RLQ pain and GI sxs c/w gastroenteritis - no signs of PTL, ctxs resolved with IVFH. Elevated BPs made criteria for GHTN with some 150's systolic and 101 diastolic. NOrmal HELLP labs and PC ratio. Cat I FHT.  -23 hr obs to monitor BPs for progression to severe range  -24 hr urine collection  -recommend for IOL/delivery at 37 wks  -to organize f/u for outpt testing and/or transfer care to [facility]   -MFM input in [place]RO Hung      R3 Update    Pt plan to followup outpatient with [facility]  - NST BID with BPP BID  - Weekly HELLP labs  -  labor and sPEC precautions given    dw and seen with Dr.Pessel Marques Adamson, PGY3 A/P: 25 y/o  @34.4 wks GA here for evaluation for RLQ pain and diarrhea for 16 hours. Patient's symptoms are consistent with gastroenteritis vs UTI.  Labs wnl and UA neg.  During triage visit patient noted to have elevated BPs consistent with diagnosis of gHTN.  HELLP labs normal.     Plan:  -Continue to monitor BP  -P/C pending  -Fetus: Reactive tracing. Continuous toco and fetal monitoring.   -GBS: unknown @34.4 weeks GA    Patient seen w/ Dr. Hung    ATTG note    Read and agree with above.  Pt seen at the bedside    25 yo  @ 34.5 wks presents with RLQ Pain and diarrhea sxs x 2 days.  Denies ctxs/LOF/VB.  +FM.  No fevers or other associated sxs.  Onset began after possible food eaten on .  PNC with outside clinic but desires to transfer care and deliver with [facility].   In triage had elevated BPs > 4 hr making criteria for GHTN.      T(C): 36.5 (-21 @ 05:16), Max: 37.0 (-21 @ 18:12)  HR: 66 (20-21 @ 06:49) (50 - 110)  BP: 118/76 (-20-21 @ 06:15) (112/53 - 157/87)  RR: 18 (-20-21 @ 06:15) (18 - 18)  SpO2: 97% (20-21 @ 06:49) (87% - 100%)  FHT-Cat 1  [place]-had  frequent ctxs initially that responded to IVFH  VE-closed    HELLP labs sent.    CBC-5/10/32/249  Creat-0.7  LFTs-30  Coags-nl    25 yo P0 @ 34.5 wks presented with RLQ pain and GI sxs c/w gastroenteritis - no signs of PTL, ctxs resolved with IVFH. Elevated BPs made criteria for GHTN with some 150's systolic and 101 diastolic. NOrmal HELLP labs and PC ratio. Cat I FHT.  -23 hr obs to monitor BPs for progression to severe range  -24 hr urine collection  -recommend for IOL/delivery at 37 wks  -to organize f/u for outpt testing and/or transfer care to [facility]   -M input in MIL[place]RO Hung      R3 Update    Pt plan to followup outpatient with [facility]  - NST BID with BPP BID  - Weekly HELLP labs  -  labor and sPEC precautions given    dw and seen with Dr.Pessel Marques Adamson, PGY3      **** MANDY Attestation ****   25 yo  @ 34w5d here for evaluation for RLQ pain and diarrhea, however found to meet criteria for gHTN during her triage stay.  All labs wnl and UA neg, UP/C wnl. Patient with no signs or symptoms of preeclampsia at this time. Patient counseled on gHTN and need to monitor her blood pressures as well as the pregnancy more closely. Patient verbalized desire to transfer care to [facility] at this time. Return precautions reviewed with patient including BP monitoring.   - Will arrange follow up at [facility] for weekly lab work and twice weekly BPP   - Patient to take her BP at home and create a log   - Given RLQ pain improved with tylenol and work up negative, patient safe for discharge at this time     Carly Hirschberg, MANDY Fellow   Seen and D/W Dr. Prather

## 2021-07-19 NOTE — OB RN TRIAGE NOTE - FAMILY HISTORY
No pertinent family history in first degree relatives  No pertinent family history in first degree relatives

## 2021-07-19 NOTE — OB PROVIDER TRIAGE NOTE - NSHPPHYSICALEXAM_GEN_ALL_CORE
T(C): 37 (07-19-21 @ 18:29), Max: 37.0 (07-19-21 @ 18:12)  HR: 62 (07-19-21 @ 19:12) (61 - 72)  BP: 132/82 (07-19-21 @ 19:12) (132/82 - 157/87)  RR: 18 (07-19-21 @ 18:29) (18 - 18)  Gen: NAD, well-appearing   Lungs: CTAB  Heart: RRR   Abd: Soft, gravid  SVE:  0/0/-3  FHT: 125/mod mic/+acels/-decels reactive tracing  [place]: not mimi

## 2021-07-19 NOTE — OB PROVIDER TRIAGE NOTE - YOU WERE IN THE HOSPITAL FOR:
Please follow up with your OB doctor this week.  Return to L&D if you experience contractions every 3-5 minutes, leaking of fluid, vaginal bleeding like a period, or less than 5 fetal movements per hour.     f/u [address] [facility] [address], [place], [place] [id number] [phone]

## 2021-07-19 NOTE — OB PROVIDER TRIAGE NOTE - LABOR: CERVICAL CONSISTENCY
Start gabapentin 1 tablet 3 times a day and every 2 days add an extra tablet until you are taking 2 tablets 3 times a day.  Follow-up with the health Access clinic or the [facility] clinic or with Dr. Rodrigues for further dosage adjustments.    You had a borderline or high normal blood pressure reading today.  This does not necessarily mean you have hypertension.  Please followup with your/a primary physician for comprehensive blood pressure evaluation and yearly fasting cholesterol assessment.  BP Readings from Last 3 Encounters:   01/05/21 (!) 161/99   11/23/20 146/96   11/16/20 132/80       
firm

## 2021-07-20 VITALS — OXYGEN SATURATION: 97 % | HEART RATE: 61 BPM

## 2021-07-20 VITALS
HEART RATE: 76 BPM | SYSTOLIC BLOOD PRESSURE: 149 MMHG | TEMPERATURE: 99 F | DIASTOLIC BLOOD PRESSURE: 93 MMHG | RESPIRATION RATE: 18 BRPM

## 2021-07-20 LAB
CREAT ?TM UR-MCNC: 63 MG/DL — SIGNIFICANT CHANGE UP
PROT ?TM UR-MCNC: 7 MG/DL — SIGNIFICANT CHANGE UP (ref 0–12)
PROT/CREAT UR-RTO: 0.1 RATIO — SIGNIFICANT CHANGE UP (ref 0–0.2)

## 2021-07-20 RX ORDER — ACETAMINOPHEN 500 MG
975 TABLET ORAL ONCE
Refills: 0 | Status: COMPLETED | OUTPATIENT
Start: 2021-07-20 | End: 2021-07-20

## 2021-07-20 RX ADMIN — Medication 975 MILLIGRAM(S): at 02:43

## 2021-07-20 NOTE — PROGRESS NOTE ADULT - SUBJECTIVE AND OBJECTIVE BOX
mfm attending  Pt seen and counseled on morning rounds with flavia Tovar and mfm fellow Dr. Hirschberg    Pt feeling well, no acute complaints.   Denies symptoms of preeclampsia.  No acute distress, moving around in her bed without difficulty or discomfort.    27 yo  at 34w5d here for evaluation for RLQ pain and diarrhea which have improved, however found to meet criteria for gestational HTN during her triage stay with multiple mild range pressures. No severe range pressures.  All labs wnl and UA neg, UP:C wnl.     Patient with no signs or symptoms of preeclampsia at this time. Counseled on gestational HTN and need to monitor her blood pressures as well as the pregnancy more closely. Gestational HTN can be labile and may progress to preeclampsia with or without severe features.     Patient eager to get to work this morning. Stable for discharge to home where she can complete her 24hr urine protein collection. Will schedule for [facility] appointment this week along with twice weekly  testing and weekly preeclampsia labs (phone number obtained for patient, and instructed her to expect our call).     Melissa Prather

## 2021-07-20 NOTE — PROGRESS NOTE ADULT - TIME BILLING
The total time spent in preparation for this visit, medical history taking, orders, review of records, counseling the patient and the family member and writing the note was 15 minutes.

## 2021-07-21 PROCEDURE — 86901 BLOOD TYPING SEROLOGIC RH(D): CPT

## 2021-07-21 PROCEDURE — 80053 COMPREHEN METABOLIC PANEL: CPT

## 2021-07-21 PROCEDURE — 83615 LACTATE (LD) (LDH) ENZYME: CPT

## 2021-07-21 PROCEDURE — 85610 PROTHROMBIN TIME: CPT

## 2021-07-21 PROCEDURE — 86900 BLOOD TYPING SEROLOGIC ABO: CPT

## 2021-07-21 PROCEDURE — G0463: CPT

## 2021-07-21 PROCEDURE — 85025 COMPLETE CBC W/AUTO DIFF WBC: CPT

## 2021-07-21 PROCEDURE — 85730 THROMBOPLASTIN TIME PARTIAL: CPT

## 2021-07-21 PROCEDURE — 85384 FIBRINOGEN ACTIVITY: CPT

## 2021-07-21 PROCEDURE — 84156 ASSAY OF PROTEIN URINE: CPT

## 2021-07-21 PROCEDURE — 87635 SARS-COV-2 COVID-19 AMP PRB: CPT

## 2021-07-21 PROCEDURE — 82570 ASSAY OF URINE CREATININE: CPT

## 2021-07-21 PROCEDURE — 84550 ASSAY OF BLOOD/URIC ACID: CPT

## 2021-07-21 PROCEDURE — 59025 FETAL NON-STRESS TEST: CPT

## 2021-07-21 PROCEDURE — 86850 RBC ANTIBODY SCREEN: CPT

## 2021-07-21 PROCEDURE — 81003 URINALYSIS AUTO W/O SCOPE: CPT

## 2021-07-22 LAB
COLLECT DURATION TIME UR: 24 HR — SIGNIFICANT CHANGE UP
PROT 24H UR-MRATE: 60 MG/24 H — SIGNIFICANT CHANGE UP (ref 50–100)
TOTAL VOLUME - 24 HOUR: 600 ML — SIGNIFICANT CHANGE UP
URINE CREATININE CALCULATION: 0.5 G/24 H — LOW (ref 0.8–1.8)

## 2021-07-23 ENCOUNTER — ASOB RESULT (OUTPATIENT)
Age: 26
End: 2021-07-23

## 2021-07-23 ENCOUNTER — APPOINTMENT (OUTPATIENT)
Dept: ANTEPARTUM | Facility: CLINIC | Age: 26
End: 2021-07-23
Payer: MEDICAID

## 2021-07-23 PROCEDURE — 76818 FETAL BIOPHYS PROFILE W/NST: CPT

## 2021-07-23 PROCEDURE — 76816 OB US FOLLOW-UP PER FETUS: CPT

## 2021-07-27 ENCOUNTER — APPOINTMENT (OUTPATIENT)
Dept: MATERNAL FETAL MEDICINE | Facility: CLINIC | Age: 26
End: 2021-07-27
Payer: MEDICAID

## 2021-07-27 ENCOUNTER — LABORATORY RESULT (OUTPATIENT)
Age: 26
End: 2021-07-27

## 2021-07-27 ENCOUNTER — APPOINTMENT (OUTPATIENT)
Dept: ANTEPARTUM | Facility: CLINIC | Age: 26
End: 2021-07-27
Payer: MEDICAID

## 2021-07-27 ENCOUNTER — OUTPATIENT (OUTPATIENT)
Dept: OUTPATIENT SERVICES | Facility: HOSPITAL | Age: 26
LOS: 1 days | End: 2021-07-27
Payer: MEDICAID

## 2021-07-27 ENCOUNTER — ASOB RESULT (OUTPATIENT)
Age: 26
End: 2021-07-27

## 2021-07-27 ENCOUNTER — APPOINTMENT (OUTPATIENT)
Dept: ANTEPARTUM | Facility: CLINIC | Age: 26
End: 2021-07-27

## 2021-07-27 ENCOUNTER — NON-APPOINTMENT (OUTPATIENT)
Age: 26
End: 2021-07-27

## 2021-07-27 VITALS
WEIGHT: 199.38 LBS | OXYGEN SATURATION: 99 % | DIASTOLIC BLOOD PRESSURE: 80 MMHG | HEART RATE: 75 BPM | SYSTOLIC BLOOD PRESSURE: 128 MMHG

## 2021-07-27 DIAGNOSIS — Z98.890 OTHER SPECIFIED POSTPROCEDURAL STATES: Chronic | ICD-10-CM

## 2021-07-27 DIAGNOSIS — O09.899 SUPERVISION OF OTHER HIGH RISK PREGNANCIES, UNSPECIFIED TRIMESTER: ICD-10-CM

## 2021-07-27 DIAGNOSIS — Z86.19 PERSONAL HISTORY OF OTHER INFECTIOUS AND PARASITIC DISEASES: ICD-10-CM

## 2021-07-27 LAB
BILIRUB UR QL STRIP: NORMAL
GLUCOSE UR-MCNC: NORMAL
HCG UR QL: 0.2 EU/DL
HGB UR QL STRIP.AUTO: NORMAL
KETONES UR-MCNC: NORMAL
LEUKOCYTE ESTERASE UR QL STRIP: NORMAL
NITRITE UR QL STRIP: NORMAL
PH UR STRIP: 6
PROT UR STRIP-MCNC: NORMAL
SP GR UR STRIP: 1.02

## 2021-07-27 PROCEDURE — 99213 OFFICE O/P EST LOW 20 MIN: CPT

## 2021-07-27 PROCEDURE — 36415 COLL VENOUS BLD VENIPUNCTURE: CPT | Mod: NC

## 2021-07-27 PROCEDURE — 76819 FETAL BIOPHYS PROFIL W/O NST: CPT | Mod: 26

## 2021-07-27 RX ORDER — VALACYCLOVIR 500 MG/1
500 TABLET, FILM COATED ORAL
Qty: 60 | Refills: 0 | Status: ACTIVE | COMMUNITY
Start: 2021-07-27 | End: 1900-01-01

## 2021-07-28 ENCOUNTER — NON-APPOINTMENT (OUTPATIENT)
Age: 26
End: 2021-07-28

## 2021-07-28 LAB
ALBUMIN SERPL ELPH-MCNC: 4.1 G/DL — SIGNIFICANT CHANGE UP (ref 3.3–5)
ALP SERPL-CCNC: 142 U/L — HIGH (ref 40–120)
ALT FLD-CCNC: 50 U/L — HIGH (ref 10–45)
ANION GAP SERPL CALC-SCNC: 12 MMOL/L — SIGNIFICANT CHANGE UP (ref 5–17)
AST SERPL-CCNC: 36 U/L — SIGNIFICANT CHANGE UP (ref 10–40)
BILIRUB SERPL-MCNC: 0.2 MG/DL — SIGNIFICANT CHANGE UP (ref 0.2–1.2)
BUN SERPL-MCNC: 10 MG/DL — SIGNIFICANT CHANGE UP (ref 7–23)
CALCIUM SERPL-MCNC: 9.2 MG/DL — SIGNIFICANT CHANGE UP (ref 8.4–10.5)
CHLORIDE SERPL-SCNC: 104 MMOL/L — SIGNIFICANT CHANGE UP (ref 96–108)
CO2 SERPL-SCNC: 22 MMOL/L — SIGNIFICANT CHANGE UP (ref 22–31)
CREAT SERPL-MCNC: 0.81 MG/DL — SIGNIFICANT CHANGE UP (ref 0.5–1.3)
CULTURE RESULTS: SIGNIFICANT CHANGE UP
GLUCOSE SERPL-MCNC: 72 MG/DL — SIGNIFICANT CHANGE UP (ref 70–99)
GROUP B BETA STREP DNA (PCR): SIGNIFICANT CHANGE UP
GROUP B BETA STREP INTERPRETATION: SIGNIFICANT CHANGE UP
HCT VFR BLD CALC: 32.6 % — LOW (ref 34.5–45)
HGB BLD-MCNC: 10.8 G/DL — LOW (ref 11.5–15.5)
HIV 1+2 AB+HIV1 P24 AG SERPL QL IA: SIGNIFICANT CHANGE UP
LDH SERPL L TO P-CCNC: 236 U/L — SIGNIFICANT CHANGE UP (ref 50–242)
MCHC RBC-ENTMCNC: 29.7 PG — SIGNIFICANT CHANGE UP (ref 27–34)
MCHC RBC-ENTMCNC: 33.1 GM/DL — SIGNIFICANT CHANGE UP (ref 32–36)
MCV RBC AUTO: 89.6 FL — SIGNIFICANT CHANGE UP (ref 80–100)
PLATELET # BLD AUTO: 266 K/UL — SIGNIFICANT CHANGE UP (ref 150–400)
POTASSIUM SERPL-MCNC: 4.2 MMOL/L — SIGNIFICANT CHANGE UP (ref 3.5–5.3)
POTASSIUM SERPL-SCNC: 4.2 MMOL/L — SIGNIFICANT CHANGE UP (ref 3.5–5.3)
PROT SERPL-MCNC: 6.7 G/DL — SIGNIFICANT CHANGE UP (ref 6–8.3)
RBC # BLD: 3.64 M/UL — LOW (ref 3.8–5.2)
RBC # FLD: 13 % — SIGNIFICANT CHANGE UP (ref 10.3–14.5)
SODIUM SERPL-SCNC: 138 MMOL/L — SIGNIFICANT CHANGE UP (ref 135–145)
SOURCE GROUP B STREP: SIGNIFICANT CHANGE UP
SPECIMEN SOURCE: SIGNIFICANT CHANGE UP
T PALLIDUM AB TITR SER: NEGATIVE — SIGNIFICANT CHANGE UP
URATE SERPL-MCNC: 6.2 MG/DL — SIGNIFICANT CHANGE UP (ref 2.5–7)
WBC # BLD: 5.39 K/UL — SIGNIFICANT CHANGE UP (ref 3.8–10.5)
WBC # FLD AUTO: 5.39 K/UL — SIGNIFICANT CHANGE UP (ref 3.8–10.5)

## 2021-07-29 ENCOUNTER — NON-APPOINTMENT (OUTPATIENT)
Age: 26
End: 2021-07-29

## 2021-07-30 ENCOUNTER — NON-APPOINTMENT (OUTPATIENT)
Age: 26
End: 2021-07-30

## 2021-07-30 ENCOUNTER — APPOINTMENT (OUTPATIENT)
Dept: ANTEPARTUM | Facility: CLINIC | Age: 26
End: 2021-07-30
Payer: MEDICAID

## 2021-07-30 ENCOUNTER — LABORATORY RESULT (OUTPATIENT)
Age: 26
End: 2021-07-30

## 2021-07-30 ENCOUNTER — ASOB RESULT (OUTPATIENT)
Age: 26
End: 2021-07-30

## 2021-07-30 ENCOUNTER — OUTPATIENT (OUTPATIENT)
Dept: OUTPATIENT SERVICES | Facility: HOSPITAL | Age: 26
LOS: 1 days | End: 2021-07-30
Payer: MEDICAID

## 2021-07-30 DIAGNOSIS — Z98.890 OTHER SPECIFIED POSTPROCEDURAL STATES: Chronic | ICD-10-CM

## 2021-07-30 LAB
ALBUMIN SERPL ELPH-MCNC: 3.9 G/DL — SIGNIFICANT CHANGE UP (ref 3.3–5)
ALP SERPL-CCNC: 138 U/L — HIGH (ref 40–120)
ALT FLD-CCNC: 42 U/L — SIGNIFICANT CHANGE UP (ref 10–45)
ANION GAP SERPL CALC-SCNC: 12 MMOL/L — SIGNIFICANT CHANGE UP (ref 5–17)
AST SERPL-CCNC: 27 U/L — SIGNIFICANT CHANGE UP (ref 10–40)
BILIRUB SERPL-MCNC: 0.2 MG/DL — SIGNIFICANT CHANGE UP (ref 0.2–1.2)
BUN SERPL-MCNC: 7 MG/DL — SIGNIFICANT CHANGE UP (ref 7–23)
CALCIUM SERPL-MCNC: 9 MG/DL — SIGNIFICANT CHANGE UP (ref 8.4–10.5)
CHLORIDE SERPL-SCNC: 105 MMOL/L — SIGNIFICANT CHANGE UP (ref 96–108)
CO2 SERPL-SCNC: 23 MMOL/L — SIGNIFICANT CHANGE UP (ref 22–31)
CREAT SERPL-MCNC: 0.77 MG/DL — SIGNIFICANT CHANGE UP (ref 0.5–1.3)
GLUCOSE SERPL-MCNC: 80 MG/DL — SIGNIFICANT CHANGE UP (ref 70–99)
HCT VFR BLD CALC: 32.1 % — LOW (ref 34.5–45)
HGB BLD-MCNC: 10.7 G/DL — LOW (ref 11.5–15.5)
LDH SERPL L TO P-CCNC: 219 U/L — SIGNIFICANT CHANGE UP (ref 50–242)
MCHC RBC-ENTMCNC: 29.3 PG — SIGNIFICANT CHANGE UP (ref 27–34)
MCHC RBC-ENTMCNC: 33.3 GM/DL — SIGNIFICANT CHANGE UP (ref 32–36)
MCV RBC AUTO: 87.9 FL — SIGNIFICANT CHANGE UP (ref 80–100)
PLATELET # BLD AUTO: 260 K/UL — SIGNIFICANT CHANGE UP (ref 150–400)
POTASSIUM SERPL-MCNC: 3.7 MMOL/L — SIGNIFICANT CHANGE UP (ref 3.5–5.3)
POTASSIUM SERPL-SCNC: 3.7 MMOL/L — SIGNIFICANT CHANGE UP (ref 3.5–5.3)
PROT SERPL-MCNC: 6.3 G/DL — SIGNIFICANT CHANGE UP (ref 6–8.3)
RBC # BLD: 3.65 M/UL — LOW (ref 3.8–5.2)
RBC # FLD: 12.9 % — SIGNIFICANT CHANGE UP (ref 10.3–14.5)
SODIUM SERPL-SCNC: 140 MMOL/L — SIGNIFICANT CHANGE UP (ref 135–145)
URATE SERPL-MCNC: 6.2 MG/DL — SIGNIFICANT CHANGE UP (ref 2.5–7)
WBC # BLD: 4.69 K/UL — SIGNIFICANT CHANGE UP (ref 3.8–10.5)
WBC # FLD AUTO: 4.69 K/UL — SIGNIFICANT CHANGE UP (ref 3.8–10.5)

## 2021-07-30 PROCEDURE — 81003 URINALYSIS AUTO W/O SCOPE: CPT

## 2021-07-30 PROCEDURE — 87389 HIV-1 AG W/HIV-1&-2 AB AG IA: CPT

## 2021-07-30 PROCEDURE — 87086 URINE CULTURE/COLONY COUNT: CPT

## 2021-07-30 PROCEDURE — 85027 COMPLETE CBC AUTOMATED: CPT

## 2021-07-30 PROCEDURE — 76818 FETAL BIOPHYS PROFILE W/NST: CPT | Mod: 26

## 2021-07-30 PROCEDURE — 36415 COLL VENOUS BLD VENIPUNCTURE: CPT

## 2021-07-30 PROCEDURE — 87653 STREP B DNA AMP PROBE: CPT

## 2021-07-30 PROCEDURE — G0463: CPT

## 2021-07-30 PROCEDURE — 83615 LACTATE (LD) (LDH) ENZYME: CPT

## 2021-07-30 PROCEDURE — 86780 TREPONEMA PALLIDUM: CPT

## 2021-07-30 PROCEDURE — 76818 FETAL BIOPHYS PROFILE W/NST: CPT

## 2021-07-30 PROCEDURE — 76819 FETAL BIOPHYS PROFIL W/O NST: CPT

## 2021-07-30 PROCEDURE — 84550 ASSAY OF BLOOD/URIC ACID: CPT

## 2021-07-30 PROCEDURE — 80053 COMPREHEN METABOLIC PANEL: CPT

## 2021-08-01 ENCOUNTER — OUTPATIENT (OUTPATIENT)
Dept: OUTPATIENT SERVICES | Facility: HOSPITAL | Age: 26
LOS: 1 days | End: 2021-08-01
Payer: MEDICAID

## 2021-08-01 ENCOUNTER — NON-APPOINTMENT (OUTPATIENT)
Age: 26
End: 2021-08-01

## 2021-08-01 VITALS — OXYGEN SATURATION: 100 %

## 2021-08-01 VITALS — SYSTOLIC BLOOD PRESSURE: 123 MMHG | DIASTOLIC BLOOD PRESSURE: 70 MMHG | HEART RATE: 82 BPM

## 2021-08-01 DIAGNOSIS — Z3A.00 WEEKS OF GESTATION OF PREGNANCY NOT SPECIFIED: ICD-10-CM

## 2021-08-01 DIAGNOSIS — O26.899 OTHER SPECIFIED PREGNANCY RELATED CONDITIONS, UNSPECIFIED TRIMESTER: ICD-10-CM

## 2021-08-01 DIAGNOSIS — Z98.890 OTHER SPECIFIED POSTPROCEDURAL STATES: Chronic | ICD-10-CM

## 2021-08-01 LAB
ALBUMIN SERPL ELPH-MCNC: 3.8 G/DL — SIGNIFICANT CHANGE UP (ref 3.3–5)
ALP SERPL-CCNC: 136 U/L — HIGH (ref 40–120)
ALT FLD-CCNC: 33 U/L — SIGNIFICANT CHANGE UP (ref 10–45)
ANION GAP SERPL CALC-SCNC: 9 MMOL/L — SIGNIFICANT CHANGE UP (ref 5–17)
APPEARANCE UR: CLEAR — SIGNIFICANT CHANGE UP
APTT BLD: 30.4 SEC — SIGNIFICANT CHANGE UP (ref 27.5–35.5)
AST SERPL-CCNC: 24 U/L — SIGNIFICANT CHANGE UP (ref 10–40)
BASOPHILS # BLD AUTO: 0.02 K/UL — SIGNIFICANT CHANGE UP (ref 0–0.2)
BASOPHILS NFR BLD AUTO: 0.5 % — SIGNIFICANT CHANGE UP (ref 0–2)
BILIRUB SERPL-MCNC: 0.2 MG/DL — SIGNIFICANT CHANGE UP (ref 0.2–1.2)
BILIRUB UR-MCNC: NEGATIVE — SIGNIFICANT CHANGE UP
BUN SERPL-MCNC: 10 MG/DL — SIGNIFICANT CHANGE UP (ref 7–23)
CALCIUM SERPL-MCNC: 9.2 MG/DL — SIGNIFICANT CHANGE UP (ref 8.4–10.5)
CHLORIDE SERPL-SCNC: 103 MMOL/L — SIGNIFICANT CHANGE UP (ref 96–108)
CO2 SERPL-SCNC: 26 MMOL/L — SIGNIFICANT CHANGE UP (ref 22–31)
COLOR SPEC: SIGNIFICANT CHANGE UP
CREAT ?TM UR-MCNC: 55 MG/DL — SIGNIFICANT CHANGE UP
CREAT SERPL-MCNC: 0.8 MG/DL — SIGNIFICANT CHANGE UP (ref 0.5–1.3)
DIFF PNL FLD: NEGATIVE — SIGNIFICANT CHANGE UP
EOSINOPHIL # BLD AUTO: 0.03 K/UL — SIGNIFICANT CHANGE UP (ref 0–0.5)
EOSINOPHIL NFR BLD AUTO: 0.7 % — SIGNIFICANT CHANGE UP (ref 0–6)
FIBRINOGEN PPP-MCNC: 661 MG/DL — HIGH (ref 290–520)
GLUCOSE SERPL-MCNC: 66 MG/DL — LOW (ref 70–99)
GLUCOSE UR QL: NEGATIVE — SIGNIFICANT CHANGE UP
HCT VFR BLD CALC: 31.2 % — LOW (ref 34.5–45)
HGB BLD-MCNC: 10.5 G/DL — LOW (ref 11.5–15.5)
IMM GRANULOCYTES NFR BLD AUTO: 0.7 % — SIGNIFICANT CHANGE UP (ref 0–1.5)
INR BLD: 0.97 RATIO — SIGNIFICANT CHANGE UP (ref 0.88–1.16)
KETONES UR-MCNC: NEGATIVE — SIGNIFICANT CHANGE UP
LDH SERPL L TO P-CCNC: 213 U/L — SIGNIFICANT CHANGE UP (ref 50–242)
LEUKOCYTE ESTERASE UR-ACNC: NEGATIVE — SIGNIFICANT CHANGE UP
LYMPHOCYTES # BLD AUTO: 0.95 K/UL — LOW (ref 1–3.3)
LYMPHOCYTES # BLD AUTO: 22.1 % — SIGNIFICANT CHANGE UP (ref 13–44)
MCHC RBC-ENTMCNC: 29.7 PG — SIGNIFICANT CHANGE UP (ref 27–34)
MCHC RBC-ENTMCNC: 33.7 GM/DL — SIGNIFICANT CHANGE UP (ref 32–36)
MCV RBC AUTO: 88.1 FL — SIGNIFICANT CHANGE UP (ref 80–100)
MONOCYTES # BLD AUTO: 0.44 K/UL — SIGNIFICANT CHANGE UP (ref 0–0.9)
MONOCYTES NFR BLD AUTO: 10.3 % — SIGNIFICANT CHANGE UP (ref 2–14)
NEUTROPHILS # BLD AUTO: 2.82 K/UL — SIGNIFICANT CHANGE UP (ref 1.8–7.4)
NEUTROPHILS NFR BLD AUTO: 65.7 % — SIGNIFICANT CHANGE UP (ref 43–77)
NITRITE UR-MCNC: NEGATIVE — SIGNIFICANT CHANGE UP
NRBC # BLD: 0 /100 WBCS — SIGNIFICANT CHANGE UP (ref 0–0)
PH UR: 6.5 — SIGNIFICANT CHANGE UP (ref 5–8)
PLATELET # BLD AUTO: 258 K/UL — SIGNIFICANT CHANGE UP (ref 150–400)
POTASSIUM SERPL-MCNC: 3.8 MMOL/L — SIGNIFICANT CHANGE UP (ref 3.5–5.3)
POTASSIUM SERPL-SCNC: 3.8 MMOL/L — SIGNIFICANT CHANGE UP (ref 3.5–5.3)
PROT ?TM UR-MCNC: 7 MG/DL — SIGNIFICANT CHANGE UP (ref 0–12)
PROT SERPL-MCNC: 6.6 G/DL — SIGNIFICANT CHANGE UP (ref 6–8.3)
PROT UR-MCNC: NEGATIVE — SIGNIFICANT CHANGE UP
PROT/CREAT UR-RTO: 0.1 RATIO — SIGNIFICANT CHANGE UP (ref 0–0.2)
PROTHROM AB SERPL-ACNC: 11.6 SEC — SIGNIFICANT CHANGE UP (ref 10.6–13.6)
RBC # BLD: 3.54 M/UL — LOW (ref 3.8–5.2)
RBC # FLD: 12.3 % — SIGNIFICANT CHANGE UP (ref 10.3–14.5)
SODIUM SERPL-SCNC: 138 MMOL/L — SIGNIFICANT CHANGE UP (ref 135–145)
SP GR SPEC: 1.01 — SIGNIFICANT CHANGE UP (ref 1.01–1.02)
URATE SERPL-MCNC: 5.9 MG/DL — SIGNIFICANT CHANGE UP (ref 2.5–7)
UROBILINOGEN FLD QL: NEGATIVE — SIGNIFICANT CHANGE UP
WBC # BLD: 4.29 K/UL — SIGNIFICANT CHANGE UP (ref 3.8–10.5)
WBC # FLD AUTO: 4.29 K/UL — SIGNIFICANT CHANGE UP (ref 3.8–10.5)

## 2021-08-01 PROCEDURE — G0463: CPT

## 2021-08-01 PROCEDURE — 84156 ASSAY OF PROTEIN URINE: CPT

## 2021-08-01 PROCEDURE — 80053 COMPREHEN METABOLIC PANEL: CPT

## 2021-08-01 PROCEDURE — 82570 ASSAY OF URINE CREATININE: CPT

## 2021-08-01 PROCEDURE — 84550 ASSAY OF BLOOD/URIC ACID: CPT

## 2021-08-01 PROCEDURE — 85384 FIBRINOGEN ACTIVITY: CPT

## 2021-08-01 PROCEDURE — 85610 PROTHROMBIN TIME: CPT

## 2021-08-01 PROCEDURE — 85730 THROMBOPLASTIN TIME PARTIAL: CPT

## 2021-08-01 PROCEDURE — 59025 FETAL NON-STRESS TEST: CPT

## 2021-08-01 PROCEDURE — 85025 COMPLETE CBC W/AUTO DIFF WBC: CPT

## 2021-08-01 PROCEDURE — 83615 LACTATE (LD) (LDH) ENZYME: CPT

## 2021-08-01 PROCEDURE — 81003 URINALYSIS AUTO W/O SCOPE: CPT

## 2021-08-01 RX ORDER — NIFEDIPINE 30 MG
10 TABLET, EXTENDED RELEASE 24 HR ORAL ONCE
Refills: 0 | Status: DISCONTINUED | OUTPATIENT
Start: 2021-08-01 | End: 2021-08-16

## 2021-08-01 NOTE — OB PROVIDER TRIAGE NOTE - NSOBPROVIDERNOTE_OBGYN_ALL_OB_FT
27yo  at 36 3/7 by LMP, confirmed by first trimester ultrasound, (MAYA 21) presenting as follow up from [facility] for elevated LFTs and concern for sPEC.    - NST reactive  - HELLP labs pending  - Cycle BPs 25yo  at 36 3/7 by LMP, confirmed by first trimester ultrasound, (MAYA 21) presenting as follow up from [facility] for elevated LFTs and concern for sPEC.    - NST reactive  - HELLP labs pending  - Cycle BPs    R3 Addendum    Patient with multiple severe range blood pressures w/ systolic greater than 160.  HELLP labs wnl- Plts 258, Cr 0.8, AST/ALT 24/33, Coags wnl, P/C: 0.1  Patient w/ back to back severe range blood pressures while at bedside given procardia 10 IR.  Patient counseled given the severe range blood pressures at this time meets criteria for sPEC with recommendation for admission and IOL, w/ magnesium sulfate and anti-hypertensives.  Patient counseled extensively of poor maternal and fetal outcomes 2/2 to sPEC including stroke, seizure, kidney, liver failure in mother, fetal distress and death.  Emphasized to patient that at this gestational age the benefits of delivery far outweigh risks of delivery at 36.3 wks.  Patient currently refusing admission, stating she feels fine, believes pressures are artificially elevated, and hungry.  Patient and mother given time to discuss, will return for further discussion.  All questions answered, discussed w/ patient and mother at bedside, father over the phone.      MGreenman PGY3
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
## 2021-08-01 NOTE — OB PROVIDER TRIAGE NOTE - NSHPLABSRESULTS_GEN_ALL_CORE
10.5   4.29  )-----------( 258      ( 08-01 @ 18:59 )             31.2     08-01 @ 18:59    138  |  103  |  10  ----------------------------<  66  3.8   |  26  |  0.80    Ca    9.2      08-01 @ 18:59    TPro  6.6  /  Alb  3.8  /  TBili  0.2  /  DBili  x   /  AST  24  /  ALT  33  /  AlkPhos  136  08-01 @ 18:59    PT/INR - ( 08-01 @ 18:59 )   PT: 11.6 sec;   INR: 0.97 ratio    PTT - ( 08-01 @ 18:59 )  PTT:30.4 sec    Uric Acid: (08-01 @ 18:59)  5.9      Fibrinogen: (08-01 @ 18:59)  661      LDH: (08-01 @ 18:59)  213

## 2021-08-02 ENCOUNTER — INPATIENT (INPATIENT)
Facility: HOSPITAL | Age: 26
LOS: 2 days | Discharge: HOME | End: 2021-08-05
Attending: OBSTETRICS & GYNECOLOGY | Admitting: OBSTETRICS & GYNECOLOGY
Payer: MEDICAID

## 2021-08-02 DIAGNOSIS — Z98.890 OTHER SPECIFIED POSTPROCEDURAL STATES: Chronic | ICD-10-CM

## 2021-08-02 DIAGNOSIS — Z3A.00 WEEKS OF GESTATION OF PREGNANCY NOT SPECIFIED: ICD-10-CM

## 2021-08-02 DIAGNOSIS — Z34.80 ENCOUNTER FOR SUPERVISION OF OTHER NORMAL PREGNANCY, UNSPECIFIED TRIMESTER: ICD-10-CM

## 2021-08-02 DIAGNOSIS — O26.899 OTHER SPECIFIED PREGNANCY RELATED CONDITIONS, UNSPECIFIED TRIMESTER: ICD-10-CM

## 2021-08-02 LAB
ALBUMIN SERPL ELPH-MCNC: 3.7 G/DL — SIGNIFICANT CHANGE UP (ref 3.3–5)
ALP SERPL-CCNC: 133 U/L — HIGH (ref 40–120)
ALT FLD-CCNC: 32 U/L — SIGNIFICANT CHANGE UP (ref 10–45)
ANION GAP SERPL CALC-SCNC: 12 MMOL/L — SIGNIFICANT CHANGE UP (ref 5–17)
APPEARANCE UR: ABNORMAL
APTT BLD: 29.2 SEC — SIGNIFICANT CHANGE UP (ref 27.5–35.5)
AST SERPL-CCNC: 24 U/L — SIGNIFICANT CHANGE UP (ref 10–40)
BACTERIA # UR AUTO: ABNORMAL
BASOPHILS # BLD AUTO: 0.02 K/UL — SIGNIFICANT CHANGE UP (ref 0–0.2)
BASOPHILS NFR BLD AUTO: 0.4 % — SIGNIFICANT CHANGE UP (ref 0–2)
BILIRUB SERPL-MCNC: 0.1 MG/DL — LOW (ref 0.2–1.2)
BILIRUB UR-MCNC: NEGATIVE — SIGNIFICANT CHANGE UP
BLD GP AB SCN SERPL QL: NEGATIVE — SIGNIFICANT CHANGE UP
BUN SERPL-MCNC: 8 MG/DL — SIGNIFICANT CHANGE UP (ref 7–23)
CALCIUM SERPL-MCNC: 8.7 MG/DL — SIGNIFICANT CHANGE UP (ref 8.4–10.5)
CHLORIDE SERPL-SCNC: 104 MMOL/L — SIGNIFICANT CHANGE UP (ref 96–108)
CO2 SERPL-SCNC: 22 MMOL/L — SIGNIFICANT CHANGE UP (ref 22–31)
COLOR SPEC: YELLOW — SIGNIFICANT CHANGE UP
CREAT ?TM UR-MCNC: 115 MG/DL — SIGNIFICANT CHANGE UP
CREAT SERPL-MCNC: 0.71 MG/DL — SIGNIFICANT CHANGE UP (ref 0.5–1.3)
DIFF PNL FLD: NEGATIVE — SIGNIFICANT CHANGE UP
EOSINOPHIL # BLD AUTO: 0.06 K/UL — SIGNIFICANT CHANGE UP (ref 0–0.5)
EOSINOPHIL NFR BLD AUTO: 1.3 % — SIGNIFICANT CHANGE UP (ref 0–6)
EPI CELLS # UR: 2 — SIGNIFICANT CHANGE UP
FIBRINOGEN PPP-MCNC: 643 MG/DL — HIGH (ref 290–520)
GLUCOSE SERPL-MCNC: 98 MG/DL — SIGNIFICANT CHANGE UP (ref 70–99)
GLUCOSE UR QL: NEGATIVE — SIGNIFICANT CHANGE UP
HCT VFR BLD CALC: 30.3 % — LOW (ref 34.5–45)
HGB BLD-MCNC: 10.1 G/DL — LOW (ref 11.5–15.5)
HYALINE CASTS # UR AUTO: 1 /LPF — SIGNIFICANT CHANGE UP (ref 0–7)
IMM GRANULOCYTES NFR BLD AUTO: 0.2 % — SIGNIFICANT CHANGE UP (ref 0–1.5)
INR BLD: 0.94 RATIO — SIGNIFICANT CHANGE UP (ref 0.88–1.16)
KETONES UR-MCNC: NEGATIVE — SIGNIFICANT CHANGE UP
LDH SERPL L TO P-CCNC: 226 U/L — SIGNIFICANT CHANGE UP (ref 50–242)
LEUKOCYTE ESTERASE UR-ACNC: NEGATIVE — SIGNIFICANT CHANGE UP
LYMPHOCYTES # BLD AUTO: 0.98 K/UL — LOW (ref 1–3.3)
LYMPHOCYTES # BLD AUTO: 21.5 % — SIGNIFICANT CHANGE UP (ref 13–44)
MCHC RBC-ENTMCNC: 29.8 PG — SIGNIFICANT CHANGE UP (ref 27–34)
MCHC RBC-ENTMCNC: 33.3 GM/DL — SIGNIFICANT CHANGE UP (ref 32–36)
MCV RBC AUTO: 89.4 FL — SIGNIFICANT CHANGE UP (ref 80–100)
MONOCYTES # BLD AUTO: 0.4 K/UL — SIGNIFICANT CHANGE UP (ref 0–0.9)
MONOCYTES NFR BLD AUTO: 8.8 % — SIGNIFICANT CHANGE UP (ref 2–14)
NEUTROPHILS # BLD AUTO: 3.09 K/UL — SIGNIFICANT CHANGE UP (ref 1.8–7.4)
NEUTROPHILS NFR BLD AUTO: 67.8 % — SIGNIFICANT CHANGE UP (ref 43–77)
NITRITE UR-MCNC: NEGATIVE — SIGNIFICANT CHANGE UP
NRBC # BLD: 0 /100 WBCS — SIGNIFICANT CHANGE UP (ref 0–0)
PH UR: 6.5 — SIGNIFICANT CHANGE UP (ref 5–8)
PLATELET # BLD AUTO: 246 K/UL — SIGNIFICANT CHANGE UP (ref 150–400)
POTASSIUM SERPL-MCNC: 3.5 MMOL/L — SIGNIFICANT CHANGE UP (ref 3.5–5.3)
POTASSIUM SERPL-SCNC: 3.5 MMOL/L — SIGNIFICANT CHANGE UP (ref 3.5–5.3)
PROT ?TM UR-MCNC: 14 MG/DL — HIGH (ref 0–12)
PROT SERPL-MCNC: 6.4 G/DL — SIGNIFICANT CHANGE UP (ref 6–8.3)
PROT UR-MCNC: ABNORMAL
PROT/CREAT UR-RTO: 0.1 RATIO — SIGNIFICANT CHANGE UP (ref 0–0.2)
PROTHROM AB SERPL-ACNC: 11.3 SEC — SIGNIFICANT CHANGE UP (ref 10.6–13.6)
RBC # BLD: 3.39 M/UL — LOW (ref 3.8–5.2)
RBC # FLD: 12.5 % — SIGNIFICANT CHANGE UP (ref 10.3–14.5)
RBC CASTS # UR COMP ASSIST: 2 /HPF — SIGNIFICANT CHANGE UP (ref 0–4)
RH IG SCN BLD-IMP: POSITIVE — SIGNIFICANT CHANGE UP
SODIUM SERPL-SCNC: 138 MMOL/L — SIGNIFICANT CHANGE UP (ref 135–145)
SP GR SPEC: 1.01 — SIGNIFICANT CHANGE UP (ref 1.01–1.02)
URATE SERPL-MCNC: 6.3 MG/DL — SIGNIFICANT CHANGE UP (ref 2.5–7)
UROBILINOGEN FLD QL: NEGATIVE — SIGNIFICANT CHANGE UP
WBC # BLD: 4.56 K/UL — SIGNIFICANT CHANGE UP (ref 3.8–10.5)
WBC # FLD AUTO: 4.56 K/UL — SIGNIFICANT CHANGE UP (ref 3.8–10.5)
WBC UR QL: 3 /HPF — SIGNIFICANT CHANGE UP (ref 0–5)

## 2021-08-02 RX ORDER — SODIUM CHLORIDE 9 MG/ML
1000 INJECTION, SOLUTION INTRAVENOUS
Refills: 0 | Status: DISCONTINUED | OUTPATIENT
Start: 2021-08-02 | End: 2021-08-02

## 2021-08-02 RX ORDER — MAGNESIUM SULFATE 500 MG/ML
4 VIAL (ML) INJECTION ONCE
Refills: 0 | Status: COMPLETED | OUTPATIENT
Start: 2021-08-02 | End: 2021-08-02

## 2021-08-02 RX ORDER — VALACYCLOVIR 500 MG/1
500 TABLET, FILM COATED ORAL EVERY 12 HOURS
Refills: 0 | Status: DISCONTINUED | OUTPATIENT
Start: 2021-08-02 | End: 2021-08-03

## 2021-08-02 RX ORDER — CITRIC ACID/SODIUM CITRATE 300-500 MG
15 SOLUTION, ORAL ORAL EVERY 6 HOURS
Refills: 0 | Status: DISCONTINUED | OUTPATIENT
Start: 2021-08-02 | End: 2021-08-03

## 2021-08-02 RX ORDER — SODIUM CHLORIDE 9 MG/ML
1000 INJECTION, SOLUTION INTRAVENOUS
Refills: 0 | Status: DISCONTINUED | OUTPATIENT
Start: 2021-08-02 | End: 2021-08-05

## 2021-08-02 RX ORDER — MAGNESIUM SULFATE 500 MG/ML
2 VIAL (ML) INJECTION
Qty: 40 | Refills: 0 | Status: DISCONTINUED | OUTPATIENT
Start: 2021-08-02 | End: 2021-08-03

## 2021-08-02 RX ORDER — OXYTOCIN 10 UNIT/ML
333.33 VIAL (ML) INJECTION
Qty: 20 | Refills: 0 | Status: DISCONTINUED | OUTPATIENT
Start: 2021-08-02 | End: 2021-08-03

## 2021-08-02 RX ORDER — NIFEDIPINE 30 MG
30 TABLET, EXTENDED RELEASE 24 HR ORAL DAILY
Refills: 0 | Status: DISCONTINUED | OUTPATIENT
Start: 2021-08-02 | End: 2021-08-03

## 2021-08-02 RX ADMIN — SODIUM CHLORIDE 125 MILLILITER(S): 9 INJECTION, SOLUTION INTRAVENOUS at 21:06

## 2021-08-02 RX ADMIN — Medication 200 GRAM(S): at 22:53

## 2021-08-02 RX ADMIN — Medication 50 GM/HR: at 23:16

## 2021-08-02 RX ADMIN — Medication 12 MILLIGRAM(S): at 22:00

## 2021-08-02 NOTE — CHART NOTE - NSCHARTNOTEFT_GEN_A_CORE
R3 update    Patient seen and evaluated at bedside after notification by RN patient would like to leave.  At bedside patient reporting she would like d/c from hospital.  Patient previously counseled on diagnosis of sPEC and counseled on recommendation for admission and IOL.  Recommendations again reiterated to patient that at this time by severe range blood pressures she meets diagnostic criteria for sPEC.  Ob recommendations at this time include hospital admission to L&D, betamethasone for fetal lung maturity in the late , magnesium sulfate for seizure prophylaxis, long standing blood pressure control medications, and IOL using induction agents.  Maternal risks of progression of sPEC including kidney and liver damage, stroke, and seizure reviewed.  Poor fetal outcomes including most severe, fetal death also reviewed.  Patient states at this time she feels well and can sense there is nothing wrong with her baby.  She prefers to go home to finalize maternity leave paperwork, prepare carseat and  clothes.  Patient was reassured assistance can be provided in the hospital to make arrangements, patient declining.  Patient states she is willing to return on 8/3 at 37 wks and will be amemable for induction at that time.  Patient and mother at bedside given opportunity to have all questions answered.  Following extensive counseling patient continuing to refuse admission and requesting discharge.  Patient informed this is against medical advice and she willing signed AMA paperwork.  Patient reassured she can return to the hospital at any time and will receive the necessary treatment.    Pt seen and counseled w/ Dr. Kevin Najera PGY3 R3 update    Patient seen and evaluated at bedside after notification by RN patient would like to leave.  At bedside patient reporting she would like d/c from hospital.  Patient previously counseled on diagnosis of sPEC and counseled on recommendation for admission and IOL.  Recommendations again reiterated to patient that at this time by severe range blood pressures she meets diagnostic criteria for sPEC.  Ob recommendations at this time include hospital admission to L&D, betamethasone for fetal lung maturity in the late , magnesium sulfate for seizure prophylaxis, long standing blood pressure control medications, and IOL using induction agents.  Maternal risks of progression of sPEC including kidney and liver damage, stroke, and seizure reviewed.  Poor fetal outcomes including most severe, fetal death also reviewed.  Patient states at this time she feels well and can sense there is nothing wrong with her baby.  She prefers to go home to finalize maternity leave paperwork, prepare carseat and  clothes.  Patient was reassured assistance can be provided in the hospital to make arrangements, patient declining.  Patient states she is willing to return on 8/3 at 37 wks and will be amemable for induction at that time.  Patient and mother at bedside given opportunity to have all questions answered.  Following extensive counseling patient continuing to refuse admission and requesting discharge.  Patient informed this is against medical advice and she willing signed AMA paperwork.  Patient reassured she can return to the hospital at any time and will receive the necessary treatment.    Pt seen and counseled w/ Dr. Kevin Najera PGY3    Service attending note  Came on service and was informed of patient.   25 y/o  @36w3d with gHTN and history of non-compliance. [facility] had [facility]P labs which were repeated on  and she had a rise in ALT. Pt was informed to go to L&D for BP monitoring and repeat HELLP labs but patient refused due to being busy. Pt presented to APT on  and was instructed to go to L&D for further evaluation and she refused stating she had to get her hair done. Pt presented today to L&D and was found to have two severe range blood pressures and met criteria for sPEC due to elevated BP's. Pt was give 10 of procardia and BP's improved. Dr. Orosco initially spoke to patient and recommended IOL along with BMZ and magnesium for seizure prophylaxis. Pt refused. I went and spoke to the patient extensively discussing the risks of refusing induction and leaving the hospital. All risks were discussed as documented above. Pt states "I feel that my nails are too long and they are dangerous for the baby. I have to go home and cut my nails and wash my babies clothes. I understand what you are saying but I know my baby is fine and I will come back if I think anything is wrong". Pt wished to leave and signed AMA paperwork.     Gabriela Brown MD, MPH

## 2021-08-02 NOTE — OB PROVIDER H&P - NSHPLABSRESULTS_GEN_ALL_CORE
25yo  at 36 4/7 by LMP, confirmed by first trimester ultrasound, (MAYA 21) presenting w/ sPEC  -admit to labor and delivery  -routine admission labs, HELLP labs  -IV and fluids  -clear liquid diet  -magnesium sulfate for seizure ppx  -procardia 30 XL QD  -betamethasone for fetal lung maturity  -IOL w/ PO cytotec  -valtrex for HSV suppression    d/w Dr. Hung, MANDY aware  Astria Toppenish Hospitalashwin PGY3

## 2021-08-02 NOTE — OB PROVIDER H&P - NSHPPHYSICALEXAM_GEN_ALL_CORE
Gen: NAD  HR 64  /84  CV: RRR  Pulm: nonlabored breathing on room  Abd: soft, nontender, gravid uterus  SSE: no active lesions  SVE: 0/50/-3  TAUS: vertex  FHT: baseline 125, mod mic, +accels, no decels  [place]: no ctx, uterine irritability

## 2021-08-02 NOTE — OB RN PATIENT PROFILE - NSSTATEDREASONFORADM_OBGYN_A_OB_FT
" I am not feeling the baby move as much and I am having pain in my belly and I am here to be induced "
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
## 2021-08-02 NOTE — OB PROVIDER H&P - ATTENDING COMMENTS
ATTG on service note    Pt interviewed at the bedside.   chart and hospital course reviewed.    Pt is a 27 yo  @ 36.5 wks admitted for IOL for sPEC.  Pt dx with GHTN on  when presented to lnD for gastroenteritis and RLQ pain and sent to [facility] for further f/u.  Pt started care and had normal BP and testing.  rpt chem showed elevated LFT and recommendation for eval on LnD on  but pt refused to come.  Pt arrived on  and had severe range BPs s/p IVP but pt left AMA and refused IOL/Mag and mgt for sPEC.  Pt now presents for IOL for sPEC.  No severe features.  [place] [facility] [facility], h/o HSV started on Valtrex      T(C): 37.2 (21 @ 22:58), Max: 37.2 (21 @ 20:09)  HR: 72 (21 @ 01:37) (56 - 87)  BP: 136/80 (21 @ 01:29) (131/80 - 182/89)  RR: 18 (21 @ 22:58) (18 - 18)  SpO2: 94% (21 @ 01:37) (93% - 100%)        VE-0/50/-3  FHT-baseline 120, moderate variability, no decels, +accels  toco-irreg  EFW-3200 gms (extrapolated from u/s on  of 2951 gms (83%)  GBS neg    Labs   CBC-4.5/10.1/30.3/246  creat-0.71  LFTs-32  MBT-O+  COVID pending  GBS neg    a/p:27 yo  @ 36.5 wks admitted for IOL sPEC based on BPs.  Severe range BP today but no need for IVP tonight.  Normal HELLP labs.  Pt amenable to IOL.  Cat I FHT.  GBS neg.  -admit for IOL for sPEC  -Mag for seizure ppx  -Procardia for maintenance, anti-HTN IVP prn  -Betamethasone for FLM  -unfavorable cervix for po cytotec  -epidural for pain mngt prn  -GBS neg  -high PPH risk sPEC  -SCD for DVT ppx  -girl fetus  -undecided on contraception  -anticipate   -case d/w elroy Hung

## 2021-08-03 ENCOUNTER — NON-APPOINTMENT (OUTPATIENT)
Age: 26
End: 2021-08-03

## 2021-08-03 ENCOUNTER — APPOINTMENT (OUTPATIENT)
Dept: MATERNAL FETAL MEDICINE | Facility: CLINIC | Age: 26
End: 2021-08-03

## 2021-08-03 ENCOUNTER — APPOINTMENT (OUTPATIENT)
Dept: ANTEPARTUM | Facility: CLINIC | Age: 26
End: 2021-08-03

## 2021-08-03 LAB
ALBUMIN SERPL ELPH-MCNC: 4.3 G/DL — SIGNIFICANT CHANGE UP (ref 3.3–5)
ALP SERPL-CCNC: 169 U/L — HIGH (ref 40–120)
ALT FLD-CCNC: 40 U/L — SIGNIFICANT CHANGE UP (ref 10–45)
ANION GAP SERPL CALC-SCNC: 14 MMOL/L — SIGNIFICANT CHANGE UP (ref 5–17)
APTT BLD: 29.1 SEC — SIGNIFICANT CHANGE UP (ref 27.5–35.5)
AST SERPL-CCNC: 30 U/L — SIGNIFICANT CHANGE UP (ref 10–40)
BASOPHILS # BLD AUTO: 0 K/UL — SIGNIFICANT CHANGE UP (ref 0–0.2)
BASOPHILS # BLD AUTO: 0.01 K/UL — SIGNIFICANT CHANGE UP (ref 0–0.2)
BASOPHILS NFR BLD AUTO: 0 % — SIGNIFICANT CHANGE UP (ref 0–2)
BASOPHILS NFR BLD AUTO: 0.1 % — SIGNIFICANT CHANGE UP (ref 0–2)
BILIRUB SERPL-MCNC: 0.3 MG/DL — SIGNIFICANT CHANGE UP (ref 0.2–1.2)
BUN SERPL-MCNC: 5 MG/DL — LOW (ref 7–23)
CALCIUM SERPL-MCNC: 8.5 MG/DL — SIGNIFICANT CHANGE UP (ref 8.4–10.5)
CHLORIDE SERPL-SCNC: 99 MMOL/L — SIGNIFICANT CHANGE UP (ref 96–108)
CO2 SERPL-SCNC: 21 MMOL/L — LOW (ref 22–31)
COVID-19 SPIKE DOMAIN AB INTERP: POSITIVE
COVID-19 SPIKE DOMAIN ANTIBODY RESULT: 9.3 U/ML — HIGH
CREAT SERPL-MCNC: 0.67 MG/DL — SIGNIFICANT CHANGE UP (ref 0.5–1.3)
EOSINOPHIL # BLD AUTO: 0 K/UL — SIGNIFICANT CHANGE UP (ref 0–0.5)
EOSINOPHIL # BLD AUTO: 0.01 K/UL — SIGNIFICANT CHANGE UP (ref 0–0.5)
EOSINOPHIL NFR BLD AUTO: 0 % — SIGNIFICANT CHANGE UP (ref 0–6)
EOSINOPHIL NFR BLD AUTO: 0.2 % — SIGNIFICANT CHANGE UP (ref 0–6)
FIBRINOGEN PPP-MCNC: 800 MG/DL — HIGH (ref 290–520)
GLUCOSE SERPL-MCNC: 140 MG/DL — HIGH (ref 70–99)
HBV SURFACE AG SERPL QL IA: SIGNIFICANT CHANGE UP
HCT VFR BLD CALC: 32.4 % — LOW (ref 34.5–45)
HCT VFR BLD CALC: 36.4 % — SIGNIFICANT CHANGE UP (ref 34.5–45)
HGB BLD-MCNC: 11 G/DL — LOW (ref 11.5–15.5)
HGB BLD-MCNC: 12.1 G/DL — SIGNIFICANT CHANGE UP (ref 11.5–15.5)
IMM GRANULOCYTES NFR BLD AUTO: 0.3 % — SIGNIFICANT CHANGE UP (ref 0–1.5)
IMM GRANULOCYTES NFR BLD AUTO: 0.4 % — SIGNIFICANT CHANGE UP (ref 0–1.5)
INR BLD: 0.91 RATIO — SIGNIFICANT CHANGE UP (ref 0.88–1.16)
LDH SERPL L TO P-CCNC: 227 U/L — SIGNIFICANT CHANGE UP (ref 50–242)
LDH SERPL L TO P-CCNC: 316 U/L — HIGH (ref 50–242)
LYMPHOCYTES # BLD AUTO: 0.69 K/UL — LOW (ref 1–3.3)
LYMPHOCYTES # BLD AUTO: 1.37 K/UL — SIGNIFICANT CHANGE UP (ref 1–3.3)
LYMPHOCYTES # BLD AUTO: 13.5 % — SIGNIFICANT CHANGE UP (ref 13–44)
LYMPHOCYTES # BLD AUTO: 18.6 % — SIGNIFICANT CHANGE UP (ref 13–44)
MAGNESIUM SERPL-MCNC: 4.6 MG/DL — HIGH (ref 1.6–2.6)
MAGNESIUM SERPL-MCNC: 4.8 MG/DL — HIGH (ref 1.6–2.6)
MAGNESIUM SERPL-MCNC: 5.8 MG/DL — HIGH (ref 1.6–2.6)
MAGNESIUM SERPL-MCNC: 5.9 MG/DL — HIGH (ref 1.6–2.6)
MCHC RBC-ENTMCNC: 29.2 PG — SIGNIFICANT CHANGE UP (ref 27–34)
MCHC RBC-ENTMCNC: 29.4 PG — SIGNIFICANT CHANGE UP (ref 27–34)
MCHC RBC-ENTMCNC: 33.2 GM/DL — SIGNIFICANT CHANGE UP (ref 32–36)
MCHC RBC-ENTMCNC: 34 GM/DL — SIGNIFICANT CHANGE UP (ref 32–36)
MCV RBC AUTO: 86.6 FL — SIGNIFICANT CHANGE UP (ref 80–100)
MCV RBC AUTO: 87.7 FL — SIGNIFICANT CHANGE UP (ref 80–100)
MONOCYTES # BLD AUTO: 0.12 K/UL — SIGNIFICANT CHANGE UP (ref 0–0.9)
MONOCYTES # BLD AUTO: 0.57 K/UL — SIGNIFICANT CHANGE UP (ref 0–0.9)
MONOCYTES NFR BLD AUTO: 2.3 % — SIGNIFICANT CHANGE UP (ref 2–14)
MONOCYTES NFR BLD AUTO: 7.7 % — SIGNIFICANT CHANGE UP (ref 2–14)
NEUTROPHILS # BLD AUTO: 4.29 K/UL — SIGNIFICANT CHANGE UP (ref 1.8–7.4)
NEUTROPHILS # BLD AUTO: 5.39 K/UL — SIGNIFICANT CHANGE UP (ref 1.8–7.4)
NEUTROPHILS NFR BLD AUTO: 73.3 % — SIGNIFICANT CHANGE UP (ref 43–77)
NEUTROPHILS NFR BLD AUTO: 83.6 % — HIGH (ref 43–77)
NRBC # BLD: 0 /100 WBCS — SIGNIFICANT CHANGE UP (ref 0–0)
NRBC # BLD: 0 /100 WBCS — SIGNIFICANT CHANGE UP (ref 0–0)
PLATELET # BLD AUTO: 263 K/UL — SIGNIFICANT CHANGE UP (ref 150–400)
PLATELET # BLD AUTO: 305 K/UL — SIGNIFICANT CHANGE UP (ref 150–400)
POTASSIUM SERPL-MCNC: 3.7 MMOL/L — SIGNIFICANT CHANGE UP (ref 3.5–5.3)
POTASSIUM SERPL-SCNC: 3.7 MMOL/L — SIGNIFICANT CHANGE UP (ref 3.5–5.3)
PROT ?TM UR-MCNC: 20 MG/DL — HIGH (ref 0–12)
PROT SERPL-MCNC: 7.9 G/DL — SIGNIFICANT CHANGE UP (ref 6–8.3)
PROTHROM AB SERPL-ACNC: 11 SEC — SIGNIFICANT CHANGE UP (ref 10.6–13.6)
RBC # BLD: 3.74 M/UL — LOW (ref 3.8–5.2)
RBC # BLD: 4.15 M/UL — SIGNIFICANT CHANGE UP (ref 3.8–5.2)
RBC # FLD: 12.5 % — SIGNIFICANT CHANGE UP (ref 10.3–14.5)
RBC # FLD: 12.7 % — SIGNIFICANT CHANGE UP (ref 10.3–14.5)
RUBV IGG SER-ACNC: 1.4 INDEX — SIGNIFICANT CHANGE UP
RUBV IGG SER-IMP: POSITIVE — SIGNIFICANT CHANGE UP
SARS-COV-2 IGG+IGM SERPL QL IA: 9.3 U/ML — HIGH
SARS-COV-2 IGG+IGM SERPL QL IA: POSITIVE
SARS-COV-2 RNA SPEC QL NAA+PROBE: SIGNIFICANT CHANGE UP
SODIUM SERPL-SCNC: 134 MMOL/L — LOW (ref 135–145)
T PALLIDUM AB TITR SER: NEGATIVE — SIGNIFICANT CHANGE UP
URATE SERPL-MCNC: 6.3 MG/DL — SIGNIFICANT CHANGE UP (ref 2.5–7)
URATE SERPL-MCNC: 6.3 MG/DL — SIGNIFICANT CHANGE UP (ref 2.5–7)
WBC # BLD: 5.13 K/UL — SIGNIFICANT CHANGE UP (ref 3.8–10.5)
WBC # BLD: 7.36 K/UL — SIGNIFICANT CHANGE UP (ref 3.8–10.5)
WBC # FLD AUTO: 5.13 K/UL — SIGNIFICANT CHANGE UP (ref 3.8–10.5)
WBC # FLD AUTO: 7.36 K/UL — SIGNIFICANT CHANGE UP (ref 3.8–10.5)

## 2021-08-03 PROCEDURE — 88307 TISSUE EXAM BY PATHOLOGIST: CPT | Mod: 26

## 2021-08-03 RX ORDER — DIPHENHYDRAMINE HCL 50 MG
25 CAPSULE ORAL EVERY 6 HOURS
Refills: 0 | Status: DISCONTINUED | OUTPATIENT
Start: 2021-08-03 | End: 2021-08-05

## 2021-08-03 RX ORDER — LABETALOL HCL 100 MG
20 TABLET ORAL ONCE
Refills: 0 | Status: COMPLETED | OUTPATIENT
Start: 2021-08-03 | End: 2021-08-03

## 2021-08-03 RX ORDER — DIPHENOXYLATE HCL/ATROPINE 2.5-.025MG
2 TABLET ORAL ONCE
Refills: 0 | Status: DISCONTINUED | OUTPATIENT
Start: 2021-08-03 | End: 2021-08-03

## 2021-08-03 RX ORDER — TRANEXAMIC ACID 100 MG/ML
1000 INJECTION, SOLUTION INTRAVENOUS ONCE
Refills: 0 | Status: COMPLETED | OUTPATIENT
Start: 2021-08-03 | End: 2021-08-03

## 2021-08-03 RX ORDER — OXYTOCIN 10 UNIT/ML
2 VIAL (ML) INJECTION
Qty: 30 | Refills: 0 | Status: DISCONTINUED | OUTPATIENT
Start: 2021-08-03 | End: 2021-08-05

## 2021-08-03 RX ORDER — OXYTOCIN 10 UNIT/ML
333.33 VIAL (ML) INJECTION
Qty: 20 | Refills: 0 | Status: DISCONTINUED | OUTPATIENT
Start: 2021-08-03 | End: 2021-08-05

## 2021-08-03 RX ORDER — OXYCODONE HYDROCHLORIDE 5 MG/1
5 TABLET ORAL
Refills: 0 | Status: DISCONTINUED | OUTPATIENT
Start: 2021-08-03 | End: 2021-08-05

## 2021-08-03 RX ORDER — BENZOCAINE 10 %
1 GEL (GRAM) MUCOUS MEMBRANE EVERY 6 HOURS
Refills: 0 | Status: DISCONTINUED | OUTPATIENT
Start: 2021-08-03 | End: 2021-08-05

## 2021-08-03 RX ORDER — LANOLIN
1 OINTMENT (GRAM) TOPICAL EVERY 6 HOURS
Refills: 0 | Status: DISCONTINUED | OUTPATIENT
Start: 2021-08-03 | End: 2021-08-05

## 2021-08-03 RX ORDER — DIBUCAINE 1 %
1 OINTMENT (GRAM) RECTAL EVERY 6 HOURS
Refills: 0 | Status: DISCONTINUED | OUTPATIENT
Start: 2021-08-03 | End: 2021-08-05

## 2021-08-03 RX ORDER — AER TRAVELER 0.5 G/1
1 SOLUTION RECTAL; TOPICAL EVERY 4 HOURS
Refills: 0 | Status: DISCONTINUED | OUTPATIENT
Start: 2021-08-03 | End: 2021-08-05

## 2021-08-03 RX ORDER — CARBOPROST TROMETHAMINE 250 UG/ML
250 INJECTION, SOLUTION INTRAMUSCULAR ONCE
Refills: 0 | Status: COMPLETED | OUTPATIENT
Start: 2021-08-03 | End: 2021-08-03

## 2021-08-03 RX ORDER — NIFEDIPINE 30 MG
60 TABLET, EXTENDED RELEASE 24 HR ORAL DAILY
Refills: 0 | Status: DISCONTINUED | OUTPATIENT
Start: 2021-08-03 | End: 2021-08-03

## 2021-08-03 RX ORDER — ACETAMINOPHEN 500 MG
975 TABLET ORAL
Refills: 0 | Status: DISCONTINUED | OUTPATIENT
Start: 2021-08-03 | End: 2021-08-05

## 2021-08-03 RX ORDER — NIFEDIPINE 30 MG
60 TABLET, EXTENDED RELEASE 24 HR ORAL DAILY
Refills: 0 | Status: DISCONTINUED | OUTPATIENT
Start: 2021-08-04 | End: 2021-08-05

## 2021-08-03 RX ORDER — SIMETHICONE 80 MG/1
80 TABLET, CHEWABLE ORAL EVERY 4 HOURS
Refills: 0 | Status: DISCONTINUED | OUTPATIENT
Start: 2021-08-03 | End: 2021-08-05

## 2021-08-03 RX ORDER — NIFEDIPINE 30 MG
10 TABLET, EXTENDED RELEASE 24 HR ORAL ONCE
Refills: 0 | Status: COMPLETED | OUTPATIENT
Start: 2021-08-03 | End: 2021-08-03

## 2021-08-03 RX ORDER — TETANUS TOXOID, REDUCED DIPHTHERIA TOXOID AND ACELLULAR PERTUSSIS VACCINE, ADSORBED 5; 2.5; 8; 8; 2.5 [IU]/.5ML; [IU]/.5ML; UG/.5ML; UG/.5ML; UG/.5ML
0.5 SUSPENSION INTRAMUSCULAR ONCE
Refills: 0 | Status: DISCONTINUED | OUTPATIENT
Start: 2021-08-03 | End: 2021-08-05

## 2021-08-03 RX ORDER — HYDROCORTISONE 1 %
1 OINTMENT (GRAM) TOPICAL EVERY 6 HOURS
Refills: 0 | Status: DISCONTINUED | OUTPATIENT
Start: 2021-08-03 | End: 2021-08-05

## 2021-08-03 RX ORDER — NIFEDIPINE 30 MG
30 TABLET, EXTENDED RELEASE 24 HR ORAL ONCE
Refills: 0 | Status: COMPLETED | OUTPATIENT
Start: 2021-08-03 | End: 2021-08-03

## 2021-08-03 RX ORDER — KETOROLAC TROMETHAMINE 30 MG/ML
30 SYRINGE (ML) INJECTION ONCE
Refills: 0 | Status: DISCONTINUED | OUTPATIENT
Start: 2021-08-03 | End: 2021-08-03

## 2021-08-03 RX ORDER — PRAMOXINE HYDROCHLORIDE 150 MG/15G
1 AEROSOL, FOAM RECTAL EVERY 4 HOURS
Refills: 0 | Status: DISCONTINUED | OUTPATIENT
Start: 2021-08-03 | End: 2021-08-05

## 2021-08-03 RX ORDER — OXYCODONE HYDROCHLORIDE 5 MG/1
5 TABLET ORAL ONCE
Refills: 0 | Status: DISCONTINUED | OUTPATIENT
Start: 2021-08-03 | End: 2021-08-05

## 2021-08-03 RX ORDER — MAGNESIUM HYDROXIDE 400 MG/1
30 TABLET, CHEWABLE ORAL
Refills: 0 | Status: DISCONTINUED | OUTPATIENT
Start: 2021-08-03 | End: 2021-08-05

## 2021-08-03 RX ORDER — IBUPROFEN 200 MG
600 TABLET ORAL EVERY 6 HOURS
Refills: 0 | Status: COMPLETED | OUTPATIENT
Start: 2021-08-03 | End: 2022-07-02

## 2021-08-03 RX ORDER — SODIUM CHLORIDE 9 MG/ML
3 INJECTION INTRAMUSCULAR; INTRAVENOUS; SUBCUTANEOUS EVERY 8 HOURS
Refills: 0 | Status: DISCONTINUED | OUTPATIENT
Start: 2021-08-03 | End: 2021-08-05

## 2021-08-03 RX ADMIN — Medication 20 MILLIGRAM(S): at 18:09

## 2021-08-03 RX ADMIN — Medication 30 MILLIGRAM(S): at 17:32

## 2021-08-03 RX ADMIN — Medication 975 MILLIGRAM(S): at 21:33

## 2021-08-03 RX ADMIN — Medication 2 MILLIUNIT(S)/MIN: at 14:39

## 2021-08-03 RX ADMIN — Medication 30 MILLIGRAM(S): at 09:52

## 2021-08-03 RX ADMIN — VALACYCLOVIR 500 MILLIGRAM(S): 500 TABLET, FILM COATED ORAL at 00:21

## 2021-08-03 RX ADMIN — TRANEXAMIC ACID 220 MILLIGRAM(S): 100 INJECTION, SOLUTION INTRAVENOUS at 16:12

## 2021-08-03 RX ADMIN — Medication 30 MILLIGRAM(S): at 00:20

## 2021-08-03 RX ADMIN — SODIUM CHLORIDE 3 MILLILITER(S): 9 INJECTION INTRAMUSCULAR; INTRAVENOUS; SUBCUTANEOUS at 22:00

## 2021-08-03 RX ADMIN — Medication 975 MILLIGRAM(S): at 21:56

## 2021-08-03 RX ADMIN — Medication 1000 MILLIUNIT(S)/MIN: at 19:28

## 2021-08-03 RX ADMIN — Medication 2 TABLET(S): at 16:27

## 2021-08-03 RX ADMIN — CARBOPROST TROMETHAMINE 250 MICROGRAM(S): 250 INJECTION, SOLUTION INTRAMUSCULAR at 16:23

## 2021-08-03 RX ADMIN — Medication 10 MILLIGRAM(S): at 03:06

## 2021-08-03 NOTE — OB PROVIDER LABOR PROGRESS NOTE - ASSESSMENT
Met patient and her mother. Discussed her induction with medications and anticipated length of time for progress.  Kellen RODRIGUEZ
IOL for sPEC now fully dilated.    -table setup  -instructed on pushing  -anticipate VD    D/w Dr. Harsha Baig PGY-4
A/P: IOL sPEC/Mg  - s/p procardia IR on admission  - currently on procardia 60  - f/u STAT HELLP labs  - s/p PO, arom performed with clear fluid  - will start mario Peck, PGY-3  d/w Dr. Sargent
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
## 2021-08-03 NOTE — OB RN DELIVERY SUMMARY - NSSELHIDDEN_OBGYN_ALL_OB_FT
[NS_DeliveryAttending1_OBGYN_ALL_OB_FT:[id number]],[NS_DeliveryAssist1_OBGYN_ALL_OB_FT:[id number]],[NS_DeliveryAssist2_OBGYN_ALL_OB_FT:[id number]=],[NS_DeliveryRN_OBGYN_ALL_OB_FT:[id number]]

## 2021-08-03 NOTE — OB PROVIDER DELIVERY SUMMARY - NSPROVIDERDELIVERYNOTE_OBGYN_ALL_OB_FT
Patient became fully dilated and desired to push. She pushed well and delivered a liveborn female infant. Head, both shoulders, and body delivered easily. Delayed cord clamping was done and gases collected. Placenta then delivered spontaneously and intact. Pitocin started and TXA given. Vaginal exam revealed 2nd degree perineal laceration and non-bleeding R labial laceration. The lacerations were repaired with 2.0 and 3.0 vicryl rapide suture in running fashion. Clots were noted in the TOSHA and cleared out twice. Hemabatex1 and Lomotil were given with excellent hemostasis noted at the end of the repair. Mother and baby doing well. C/w Magnesium for 24hrs PP and anti-hypertensives. C/w HELLP labs.    P. Safia PGY-4

## 2021-08-03 NOTE — OB RN DELIVERY SUMMARY - NS_DELIVERYASSIST2_OBGYN_ALL_OB_FT
Jereen, Amyeo - MD Consent (Marginal Mandibular)/Introductory Paragraph: The rationale for Mohs was explained to the patient and consent was obtained. The risks, benefits and alternatives to therapy were discussed in detail. Specifically, the risks of damage to the marginal mandibular branch of the facial nerve, infection, scarring, bleeding, prolonged wound healing, incomplete removal, allergy to anesthesia, and recurrence were addressed. Prior to the procedure, the treatment site was clearly identified and confirmed by the patient. All components of Universal Protocol/PAUSE Rule completed.

## 2021-08-03 NOTE — PRE-ANESTHESIA EVALUATION ADULT - NSANTHOSAYNRD_GEN_A_CORE
No. RICARDO screening performed.  STOP BANG Legend: 0-2 = LOW Risk; 3-4 = INTERMEDIATE Risk; 5-8 = HIGH Risk
No. RICARDO screening performed.  STOP BANG Legend: 0-2 = LOW Risk; 3-4 = INTERMEDIATE Risk; 5-8 = HIGH Risk

## 2021-08-03 NOTE — OB RN DELIVERY SUMMARY - NS_SEPSISRSKCALC_OBGYN_ALL_OB_FT
GBS status in the 'Prenatal Lab tests/results section' on the OB RN Patient Profile must be documented.   EOS calculated successfully. EOS Risk Factor: 0.5/1000 live births ([facility] national incidence); GA=36w5d; Temp=99; ROM=3.05; GBS='Negative'; Antibiotics='No antibiotics or any antibiotics < 2 hrs prior to birth'

## 2021-08-03 NOTE — OB PROVIDER DELIVERY SUMMARY - NSSELHIDDEN_OBGYN_ALL_OB_FT
[NS_DeliveryAttending1_OBGYN_ALL_OB_FT:[id number]],[NS_DeliveryAssist1_OBGYN_ALL_OB_FT:[id number]=],[NS_DeliveryAssist2_OBGYN_ALL_OB_FT:[id number]=]

## 2021-08-03 NOTE — CHART NOTE - NSCHARTNOTEFT_GEN_A_CORE
Approached by RN as patient had multiple severe range BP's in succession.   Will administer Labetalol 20 mg IV push stat (pulse 88.)   Will give Procardia 60 mg tonight.  Dr. Ny notified. Will update service attendings.   MAGI Gibson

## 2021-08-04 DIAGNOSIS — O09.90 SUPERVISION OF HIGH RISK PREGNANCY, UNSPECIFIED, UNSPECIFIED TRIMESTER: ICD-10-CM

## 2021-08-04 DIAGNOSIS — O13.3 GESTATIONAL [PREGNANCY-INDUCED] HYPERTENSION WITHOUT SIGNIFICANT PROTEINURIA, THIRD TRIMESTER: ICD-10-CM

## 2021-08-04 LAB
ALBUMIN SERPL ELPH-MCNC: 3.4 G/DL — SIGNIFICANT CHANGE UP (ref 3.3–5)
ALP SERPL-CCNC: 110 U/L — SIGNIFICANT CHANGE UP (ref 40–120)
ALT FLD-CCNC: 34 U/L — SIGNIFICANT CHANGE UP (ref 10–45)
ANION GAP SERPL CALC-SCNC: 11 MMOL/L — SIGNIFICANT CHANGE UP (ref 5–17)
APTT BLD: 20.5 SEC — LOW (ref 27.5–35.5)
AST SERPL-CCNC: 37 U/L — SIGNIFICANT CHANGE UP (ref 10–40)
BASOPHILS # BLD AUTO: 0.02 K/UL — SIGNIFICANT CHANGE UP (ref 0–0.2)
BASOPHILS NFR BLD AUTO: 0.2 % — SIGNIFICANT CHANGE UP (ref 0–2)
BILIRUB SERPL-MCNC: 0.2 MG/DL — SIGNIFICANT CHANGE UP (ref 0.2–1.2)
BUN SERPL-MCNC: 6 MG/DL — LOW (ref 7–23)
CALCIUM SERPL-MCNC: 7.1 MG/DL — LOW (ref 8.4–10.5)
CHLORIDE SERPL-SCNC: 102 MMOL/L — SIGNIFICANT CHANGE UP (ref 96–108)
CO2 SERPL-SCNC: 25 MMOL/L — SIGNIFICANT CHANGE UP (ref 22–31)
CREAT SERPL-MCNC: 0.71 MG/DL — SIGNIFICANT CHANGE UP (ref 0.5–1.3)
EOSINOPHIL # BLD AUTO: 0.01 K/UL — SIGNIFICANT CHANGE UP (ref 0–0.5)
EOSINOPHIL NFR BLD AUTO: 0.1 % — SIGNIFICANT CHANGE UP (ref 0–6)
FIBRINOGEN PPP-MCNC: 585 MG/DL — HIGH (ref 290–520)
GLUCOSE SERPL-MCNC: 103 MG/DL — HIGH (ref 70–99)
HCT VFR BLD CALC: 25.3 % — LOW (ref 34.5–45)
HGB BLD-MCNC: 8.7 G/DL — LOW (ref 11.5–15.5)
IMM GRANULOCYTES NFR BLD AUTO: 0.4 % — SIGNIFICANT CHANGE UP (ref 0–1.5)
INR BLD: 0.87 RATIO — LOW (ref 0.88–1.16)
LDH SERPL L TO P-CCNC: 344 U/L — HIGH (ref 50–242)
LYMPHOCYTES # BLD AUTO: 1.05 K/UL — SIGNIFICANT CHANGE UP (ref 1–3.3)
LYMPHOCYTES # BLD AUTO: 8 % — LOW (ref 13–44)
MAGNESIUM SERPL-MCNC: 5.8 MG/DL — HIGH (ref 1.6–2.6)
MAGNESIUM SERPL-MCNC: 5.9 MG/DL — HIGH (ref 1.6–2.6)
MCHC RBC-ENTMCNC: 30.2 PG — SIGNIFICANT CHANGE UP (ref 27–34)
MCHC RBC-ENTMCNC: 34.4 GM/DL — SIGNIFICANT CHANGE UP (ref 32–36)
MCV RBC AUTO: 87.8 FL — SIGNIFICANT CHANGE UP (ref 80–100)
MONOCYTES # BLD AUTO: 0.91 K/UL — HIGH (ref 0–0.9)
MONOCYTES NFR BLD AUTO: 7 % — SIGNIFICANT CHANGE UP (ref 2–14)
NEUTROPHILS # BLD AUTO: 11.03 K/UL — HIGH (ref 1.8–7.4)
NEUTROPHILS NFR BLD AUTO: 84.3 % — HIGH (ref 43–77)
NRBC # BLD: 0 /100 WBCS — SIGNIFICANT CHANGE UP (ref 0–0)
PLATELET # BLD AUTO: 246 K/UL — SIGNIFICANT CHANGE UP (ref 150–400)
POTASSIUM SERPL-MCNC: 3.5 MMOL/L — SIGNIFICANT CHANGE UP (ref 3.5–5.3)
POTASSIUM SERPL-SCNC: 3.5 MMOL/L — SIGNIFICANT CHANGE UP (ref 3.5–5.3)
PROT SERPL-MCNC: 5.8 G/DL — LOW (ref 6–8.3)
PROTHROM AB SERPL-ACNC: 10.5 SEC — LOW (ref 10.6–13.6)
RBC # BLD: 2.88 M/UL — LOW (ref 3.8–5.2)
RBC # FLD: 12.8 % — SIGNIFICANT CHANGE UP (ref 10.3–14.5)
SODIUM SERPL-SCNC: 138 MMOL/L — SIGNIFICANT CHANGE UP (ref 135–145)
URATE SERPL-MCNC: 6.5 MG/DL — SIGNIFICANT CHANGE UP (ref 2.5–7)
WBC # BLD: 13.07 K/UL — HIGH (ref 3.8–10.5)
WBC # FLD AUTO: 13.07 K/UL — HIGH (ref 3.8–10.5)

## 2021-08-04 RX ORDER — MAGNESIUM SULFATE 500 MG/ML
2 VIAL (ML) INJECTION
Qty: 40 | Refills: 0 | Status: DISCONTINUED | OUTPATIENT
Start: 2021-08-04 | End: 2021-08-04

## 2021-08-04 RX ORDER — IBUPROFEN 200 MG
600 TABLET ORAL EVERY 6 HOURS
Refills: 0 | Status: DISCONTINUED | OUTPATIENT
Start: 2021-08-04 | End: 2021-08-05

## 2021-08-04 RX ADMIN — Medication 600 MILLIGRAM(S): at 23:46

## 2021-08-04 RX ADMIN — SODIUM CHLORIDE 3 MILLILITER(S): 9 INJECTION INTRAMUSCULAR; INTRAVENOUS; SUBCUTANEOUS at 11:00

## 2021-08-04 RX ADMIN — Medication 975 MILLIGRAM(S): at 11:23

## 2021-08-04 RX ADMIN — Medication 975 MILLIGRAM(S): at 21:29

## 2021-08-04 RX ADMIN — Medication 600 MILLIGRAM(S): at 05:56

## 2021-08-04 RX ADMIN — Medication 600 MILLIGRAM(S): at 11:02

## 2021-08-04 RX ADMIN — Medication 975 MILLIGRAM(S): at 03:20

## 2021-08-04 RX ADMIN — Medication 600 MILLIGRAM(S): at 02:37

## 2021-08-04 RX ADMIN — Medication 600 MILLIGRAM(S): at 06:23

## 2021-08-04 RX ADMIN — Medication 1 TABLET(S): at 11:02

## 2021-08-04 RX ADMIN — Medication 975 MILLIGRAM(S): at 03:47

## 2021-08-04 RX ADMIN — Medication 60 MILLIGRAM(S): at 08:50

## 2021-08-04 RX ADMIN — Medication 975 MILLIGRAM(S): at 18:10

## 2021-08-04 RX ADMIN — Medication 975 MILLIGRAM(S): at 21:59

## 2021-08-04 RX ADMIN — Medication 600 MILLIGRAM(S): at 11:42

## 2021-08-04 RX ADMIN — Medication 975 MILLIGRAM(S): at 15:37

## 2021-08-04 RX ADMIN — SODIUM CHLORIDE 3 MILLILITER(S): 9 INJECTION INTRAMUSCULAR; INTRAVENOUS; SUBCUTANEOUS at 06:00

## 2021-08-04 RX ADMIN — Medication 600 MILLIGRAM(S): at 01:40

## 2021-08-04 RX ADMIN — SODIUM CHLORIDE 50 MILLILITER(S): 9 INJECTION, SOLUTION INTRAVENOUS at 15:39

## 2021-08-04 RX ADMIN — Medication 975 MILLIGRAM(S): at 08:50

## 2021-08-04 RX ADMIN — Medication 600 MILLIGRAM(S): at 18:12

## 2021-08-04 RX ADMIN — Medication 600 MILLIGRAM(S): at 18:10

## 2021-08-04 RX ADMIN — Medication 50 GM/HR: at 15:40

## 2021-08-04 NOTE — PROGRESS NOTE ADULT - ASSESSMENT
27yo PPD#1 s/p  c/w sPEC.  Patient is stable and doing well post-partum.     #sPEC  - Mg (-), to be d/c  at 430p  - Procardia 60 XL  - s/p procardia 10 IR following delivery on 8/3 for severe range pressures  - blood pressure monitoring, improved since delivery  - cardio OB referral  - f/u AM HELLP labs    #Well being  - Pain well controlled, continue current pain regimen  - Increase ambulation, SCDs when not ambulating  - Continue regular diet  - contraception: condoms    Abimbola Balderas, PGY3 25yo PPD#1 s/p  c/w sPEC.  Patient is stable and doing well post-partum.     #sPEC  - Mg (-), to be d/c  at 430p  - Procardia 60 XL  - s/p procardia 10 IR following delivery on 8/3 for severe range pressures  - blood pressure monitoring, improved since delivery  - cardio OB referral  - f/u AM HELLP labs    #Well being  - Pain well controlled, continue current pain regimen  - Increase ambulation, SCDs when not ambulating  - Continue regular diet  - contraception: condoms    Abimbola Balderas, PGY3      ATTG:  Pt seen and evaluated  BP's 120's-140's/60's-80's over last 24 hrs  Stable PPD#1 s/p  after induction for severe PEC  BP's well controlled on Procardia  Will observe in-house for BP stability for another 24 hrs

## 2021-08-05 ENCOUNTER — TRANSCRIPTION ENCOUNTER (OUTPATIENT)
Age: 26
End: 2021-08-05

## 2021-08-05 VITALS
RESPIRATION RATE: 18 BRPM | OXYGEN SATURATION: 95 % | DIASTOLIC BLOOD PRESSURE: 73 MMHG | TEMPERATURE: 98 F | HEART RATE: 79 BPM | SYSTOLIC BLOOD PRESSURE: 142 MMHG

## 2021-08-05 PROCEDURE — 59025 FETAL NON-STRESS TEST: CPT

## 2021-08-05 PROCEDURE — 88307 TISSUE EXAM BY PATHOLOGIST: CPT

## 2021-08-05 PROCEDURE — 86850 RBC ANTIBODY SCREEN: CPT

## 2021-08-05 PROCEDURE — 84550 ASSAY OF BLOOD/URIC ACID: CPT

## 2021-08-05 PROCEDURE — 84156 ASSAY OF PROTEIN URINE: CPT

## 2021-08-05 PROCEDURE — 82570 ASSAY OF URINE CREATININE: CPT

## 2021-08-05 PROCEDURE — 85610 PROTHROMBIN TIME: CPT

## 2021-08-05 PROCEDURE — 85025 COMPLETE CBC W/AUTO DIFF WBC: CPT

## 2021-08-05 PROCEDURE — 86762 RUBELLA ANTIBODY: CPT

## 2021-08-05 PROCEDURE — 83735 ASSAY OF MAGNESIUM: CPT

## 2021-08-05 PROCEDURE — 81001 URINALYSIS AUTO W/SCOPE: CPT

## 2021-08-05 PROCEDURE — 86780 TREPONEMA PALLIDUM: CPT

## 2021-08-05 PROCEDURE — U0003: CPT

## 2021-08-05 PROCEDURE — 86901 BLOOD TYPING SEROLOGIC RH(D): CPT

## 2021-08-05 PROCEDURE — G0463: CPT

## 2021-08-05 PROCEDURE — 80053 COMPREHEN METABOLIC PANEL: CPT

## 2021-08-05 PROCEDURE — 87340 HEPATITIS B SURFACE AG IA: CPT

## 2021-08-05 PROCEDURE — 85730 THROMBOPLASTIN TIME PARTIAL: CPT

## 2021-08-05 PROCEDURE — 85384 FIBRINOGEN ACTIVITY: CPT

## 2021-08-05 PROCEDURE — 59050 FETAL MONITOR W/REPORT: CPT

## 2021-08-05 PROCEDURE — U0005: CPT

## 2021-08-05 PROCEDURE — 83615 LACTATE (LD) (LDH) ENZYME: CPT

## 2021-08-05 PROCEDURE — 86769 SARS-COV-2 COVID-19 ANTIBODY: CPT

## 2021-08-05 PROCEDURE — 86900 BLOOD TYPING SEROLOGIC ABO: CPT

## 2021-08-05 RX ORDER — NIFEDIPINE 30 MG
1 TABLET, EXTENDED RELEASE 24 HR ORAL
Qty: 42 | Refills: 0
Start: 2021-08-05 | End: 2021-09-15

## 2021-08-05 RX ORDER — IBUPROFEN 200 MG
1 TABLET ORAL
Qty: 0 | Refills: 0 | DISCHARGE
Start: 2021-08-05

## 2021-08-05 RX ORDER — ACETAMINOPHEN 500 MG
3 TABLET ORAL
Qty: 0 | Refills: 0 | DISCHARGE
Start: 2021-08-05

## 2021-08-05 RX ADMIN — Medication 975 MILLIGRAM(S): at 15:01

## 2021-08-05 RX ADMIN — Medication 600 MILLIGRAM(S): at 05:48

## 2021-08-05 RX ADMIN — Medication 975 MILLIGRAM(S): at 08:43

## 2021-08-05 RX ADMIN — Medication 1 TABLET(S): at 15:00

## 2021-08-05 RX ADMIN — Medication 600 MILLIGRAM(S): at 12:19

## 2021-08-05 RX ADMIN — Medication 60 MILLIGRAM(S): at 08:57

## 2021-08-05 RX ADMIN — Medication 975 MILLIGRAM(S): at 03:13

## 2021-08-05 RX ADMIN — Medication 600 MILLIGRAM(S): at 00:16

## 2021-08-05 RX ADMIN — Medication 600 MILLIGRAM(S): at 17:50

## 2021-08-05 RX ADMIN — Medication 975 MILLIGRAM(S): at 03:43

## 2021-08-05 NOTE — DISCHARGE NOTE OB - PLAN OF CARE
Make your follow-up appointment with your doctor as ordered.   No heavy lifting, driving, or strenuous activity for 6 weeks.  Nothing per vagina such as tampons, intercourse, douches or tub baths for 6 weeks or until you see your doctor.  Call your doctor if you're unable to tolerate food, increase in vaginal bleeding, or have difficulty urinating. Call your doctor if you have pain that is not relieved by your perscribed medications. Notify your doctor with any other concerns.    Continue to monitor blood pressure at home. Call your doctor if your blood pressure is greater than or equal to 160 systolic (top number) of 110 diastolic (bottom number) or if you experience a headache unreleived by OTC medications, blurred vision, or difficulty breathing.    Please return to your doctor in one week for a blood pressure check. normal postpartum care

## 2021-08-05 NOTE — DISCHARGE NOTE OB - MEDICATION SUMMARY - MEDICATIONS TO STOP TAKING
I will STOP taking the medications listed below when I get home from the hospital:    methocarbamol 500 mg oral tablet  -- 1 tab(s) by mouth 3 times a day, As Needed - for muscle spasm  -- May cause drowsiness.  Alcohol may intensify this effect.  Use care when operating dangerous machinery.    metroNIDAZOLE 0.75% topical gel  -- Apply on skin to affected area once a day  -- For external use only.    Macrobid 100 mg oral capsule  -- 1 cap(s) by mouth 2 times a day  -- Finish all this medication unless otherwise directed by prescriber.  May discolor urine or feces.  Take with food or milk.    metroNIDAZOLE 500 mg oral tablet  -- 1 tab(s) by mouth 2 times a day   -- Do not drink alcoholic beverages when taking this medication.  Finish all this medication unless otherwise directed by prescriber.  May discolor urine or feces.    doxycycline hyclate 100 mg oral capsule  -- 1 cap(s) by mouth 2 times a day   -- Avoid prolonged or excessive exposure to direct and/or artificial sunlight while taking this medication.  Do not take this drug if you are pregnant.  Finish all this medication unless otherwise directed by prescriber.  Medication should be taken with plenty of water.    cyclobenzaprine 10 mg oral tablet  -- 1 tab(s) by mouth every 8 hours. Do not drive, drink alcohol or operate machinery if taking.   -- May cause drowsiness.  Alcohol may intensify this effect.  Use care when operating dangerous machinery.  Obtain medical advice before taking any non-prescription drugs as some may affect the action of this medication.

## 2021-08-05 NOTE — DISCHARGE NOTE OB - MEDICATION SUMMARY - MEDICATIONS TO TAKE
I will START or STAY ON the medications listed below when I get home from the hospital:    acetaminophen 325 mg oral tablet  -- 3 tab(s) by mouth   -- Indication: For Pain    ibuprofen 600 mg oral tablet  -- 1 tab(s) by mouth every 6 hours  -- Indication: For Pain    Procardia XL 60 mg oral tablet, extended release  -- 1 tab(s) by mouth once a day  -- Indication: For blood pressure management    Prenatal Multivitamins with Folic Acid 1 mg oral tablet  -- 1 tab(s) by mouth once a day  -- Indication: For take if breastfeeding

## 2021-08-05 NOTE — PROGRESS NOTE ADULT - SUBJECTIVE AND OBJECTIVE BOX
OB Progress Note:  PPD#2    S: 25yo PPD#2 s/p . Patient feels well. Pain is well controlled. She is tolerating a regular diet and passing flatus. She is voiding spontaneously, and ambulating without difficulty. Denies CP/SOB. Denies lightheadedness/dizziness. Denies N/V.    O:  Vitals:  Vital Signs Last 24 Hrs  T(C): 36.8 (04 Aug 2021 04:30), Max: 36.8 (03 Aug 2021 18:10)  T(F): 98.2 (04 Aug 2021 04:30), Max: 98.2 (03 Aug 2021 18:10)  HR: 86 (04 Aug 2021 04:30) (68 - 114)  BP: 145/74 (04 Aug 2021 04:30) (124/71 - 195/108)  BP(mean): 98 (03 Aug 2021 20:40) (98 - 98)  RR: 18 (04 Aug 2021 04:30) (17 - 20)  SpO2: 98% (04 Aug 2021 04:30) (89% - 100%)    MEDICATIONS  (STANDING):  acetaminophen   Tablet .. 975 milliGRAM(s) Oral <User Schedule>  betamethasone Injectable 12 milliGRAM(s) IntraMuscular every 24 hours  diphtheria/tetanus/pertussis (acellular) Vaccine (ADAcel) 0.5 milliLiter(s) IntraMuscular once  ibuprofen  Tablet. 600 milliGRAM(s) Oral every 6 hours  lactated ringers. 1000 milliLiter(s) (50 mL/Hr) IV Continuous <Continuous>  NIFEdipine XL 60 milliGRAM(s) Oral daily  oxytocin Infusion 333.333 milliUNIT(s)/Min (1000 mL/Hr) IV Continuous <Continuous>  oxytocin Infusion. 2 milliUNIT(s)/Min (2 mL/Hr) IV Continuous <Continuous>  prenatal multivitamin 1 Tablet(s) Oral daily  sodium chloride 0.9% lock flush 3 milliLiter(s) IV Push every 8 hours    Labs:  Blood type: O Positive  Rubella IgG: RPR: Negative                          12.1   7.36 >-----------< 305    (  @ 13:42 )             36.4                        11.0<L>   5.13 >-----------< 263    (  @ 06:06 )             32.4<L>                        10.1<L>   4.56 >-----------< 246    (  @ 21:41 )             30.3<L>                        10.5<L>   4.29 >-----------< 258    (  @ 18:59 )             31.2<L>    21 @ 13:42      134<L>  |  99  |  5<L>  ----------------------------<  140<H>  3.7   |  21<L>  |  0.67    21 @ 21:41      138  |  104  |  8   ----------------------------<  98  3.5   |  22  |  0.71    21 @ 18:59      138  |  103  |  10  ----------------------------<  66<L>  3.8   |  26  |  0.80    Ca    8.5      03 Aug 2021 13:42  Ca    8.7      02 Aug 2021 21:41  Ca    9.2      01 Aug 2021 18:59  Mg     5.8<H>     08-04  Mg     5.8<H>     08-03  Mg     5.9<H>     08-03  Mg     4.8<H>     08-03  Mg     4.6<H>     08-03    TPro  7.9  /  Alb  4.3  /  TBili  0.3  /  DBili  x   /  AST  30  /  ALT  40  /  AlkPhos  169<H>  21 @ 13:42  TPro  6.4  /  Alb  3.7  /  TBili  0.1<L>  /  DBili  x   /  AST  24  /  ALT  32  /  AlkPhos  133<H>  21 @ 21:41  TPro  6.6  /  Alb  3.8  /  TBili  0.2  /  DBili  x   /  AST  24  /  ALT  33  /  AlkPhos  136<H>  21 @ 18:59    Physical Exam:  General: NAD  Abdomen: soft, non-tender, non-distended, fundus firm  Vaginal: Lochia wnl  Extremities: No erythema/edema    
OB Progress Note:  PPD#1    S: 27yo PPD#1 s/p . Patient feels well. Pain is well controlled. She is tolerating a regular diet and passing flatus. She is voiding spontaneously, and ambulating without difficulty. Denies CP/SOB. Denies lightheadedness/dizziness. Denies N/V.    O:  Vitals:  Vital Signs Last 24 Hrs  T(C): 36.8 (04 Aug 2021 04:30), Max: 36.8 (03 Aug 2021 18:10)  T(F): 98.2 (04 Aug 2021 04:30), Max: 98.2 (03 Aug 2021 18:10)  HR: 86 (04 Aug 2021 04:30) (68 - 114)  BP: 145/74 (04 Aug 2021 04:30) (124/71 - 195/108)  BP(mean): 98 (03 Aug 2021 20:40) (98 - 98)  RR: 18 (04 Aug 2021 04:30) (17 - 20)  SpO2: 98% (04 Aug 2021 04:30) (89% - 100%)    MEDICATIONS  (STANDING):  acetaminophen   Tablet .. 975 milliGRAM(s) Oral <User Schedule>  betamethasone Injectable 12 milliGRAM(s) IntraMuscular every 24 hours  diphtheria/tetanus/pertussis (acellular) Vaccine (ADAcel) 0.5 milliLiter(s) IntraMuscular once  ibuprofen  Tablet. 600 milliGRAM(s) Oral every 6 hours  lactated ringers. 1000 milliLiter(s) (50 mL/Hr) IV Continuous <Continuous>  NIFEdipine XL 60 milliGRAM(s) Oral daily  oxytocin Infusion 333.333 milliUNIT(s)/Min (1000 mL/Hr) IV Continuous <Continuous>  oxytocin Infusion. 2 milliUNIT(s)/Min (2 mL/Hr) IV Continuous <Continuous>  prenatal multivitamin 1 Tablet(s) Oral daily  sodium chloride 0.9% lock flush 3 milliLiter(s) IV Push every 8 hours    Labs:  Blood type: O Positive  Rubella IgG: RPR: Negative                          12.1   7.36 >-----------< 305    (  @ 13:42 )             36.4                        11.0<L>   5.13 >-----------< 263    (  @ 06:06 )             32.4<L>                        10.1<L>   4.56 >-----------< 246    (  @ 21:41 )             30.3<L>                        10.5<L>   4.29 >-----------< 258    (  @ 18:59 )             31.2<L>    21 @ 13:42      134<L>  |  99  |  5<L>  ----------------------------<  140<H>  3.7   |  21<L>  |  0.67    21 @ 21:41      138  |  104  |  8   ----------------------------<  98  3.5   |  22  |  0.71    21 @ 18:59      138  |  103  |  10  ----------------------------<  66<L>  3.8   |  26  |  0.80    Ca    8.5      03 Aug 2021 13:42  Ca    8.7      02 Aug 2021 21:41  Ca    9.2      01 Aug 2021 18:59  Mg     5.8<H>     08-04  Mg     5.8<H>     08-03  Mg     5.9<H>     08-03  Mg     4.8<H>     08-03  Mg     4.6<H>     08-03    TPro  7.9  /  Alb  4.3  /  TBili  0.3  /  DBili  x   /  AST  30  /  ALT  40  /  AlkPhos  169<H>  21 @ 13:42  TPro  6.4  /  Alb  3.7  /  TBili  0.1<L>  /  DBili  x   /  AST  24  /  ALT  32  /  AlkPhos  133<H>  21 @ 21:41  TPro  6.6  /  Alb  3.8  /  TBili  0.2  /  DBili  x   /  AST  24  /  ALT  33  /  AlkPhos  136<H>  21 @ 18:59    Physical Exam:  General: NAD  Abdomen: soft, non-tender, non-distended, fundus firm  Vaginal: Lochia wnl  Extremities: No erythema/edema

## 2021-08-05 NOTE — DISCHARGE NOTE OB - VISION (WITH CORRECTIVE LENSES IF THE PATIENT USUALLY WEARS THEM):
"     AFTER YOUR CYSTOSCOPY         You have just completed a cystoscopy, or \"cysto\", which allowed your physician to learn more about your bladder (or to remove a stent placed after surgery). We suggest that you continue to avoid caffeine, fruit juice, and alcohol for the next 24 hours, however, you are encouraged to return to your normal activities.       A few things that are considered normal after your cystoscopy:    * small amount of bleeding (or spotting) that clears within the next 24 hours    * slight burning sensation with urination    * sensation to of needing to avoid more frequently    * the feeling of \"air\" in your urine    * mild discomfort that is relieved with Acetaminophen (Example:Tylenol)        Please contact our office promptly if you:    * develop a fever above 101 degrees    * are unable to urinate, during non-office hours seek Medical Attention.       AFTER YOUR CYSTOSCOPY        You have just completed a cystoscopy, or \"cysto\", which allowed your physician to learn more about your bladder (or to remove a stent placed after surgery). We suggest that you continue to avoid caffeine, fruit juice, and alcohol for the next 24 hours, however, you are encouraged to return to your normal activities.         A few things that are considered normal after your cystoscopy:     * Small amount of bleeding (or spotting) that clears within the next 24 hours     * Slight burning sensation with urination     * Sensation to of needing to avoid more frequently     * The feeling of \"air\" in your urine     * Mild discomfort that is relieved with Tylenol        Please contact our office promptly if you:     * Develop a fever above 101 degrees     * Are unable to urinate     * Develop bright red blood that does not stop     * Severe pain or swelling         Please contact our office with any concerns or questions @[facility].  "
Normal vision: sees adequately in most situations; can see medication labels, newsprint

## 2021-08-05 NOTE — DISCHARGE NOTE OB - CARE PLAN
Principal Discharge DX:	Postpartum care following vaginal delivery  Goal:	normal postpartum care  Assessment and plan of treatment:	Make your follow-up appointment with your doctor as ordered.   No heavy lifting, driving, or strenuous activity for 6 weeks.  Nothing per vagina such as tampons, intercourse, douches or tub baths for 6 weeks or until you see your doctor.  Call your doctor if you're unable to tolerate food, increase in vaginal bleeding, or have difficulty urinating. Call your doctor if you have pain that is not relieved by your perscribed medications. Notify your doctor with any other concerns.    Continue to monitor blood pressure at home. Call your doctor if your blood pressure is greater than or equal to 160 systolic (top number) of 110 diastolic (bottom number) or if you experience a headache unreleived by OTC medications, blurred vision, or difficulty breathing.    Please return to your doctor in one week for a blood pressure check.  Secondary Diagnosis:	Severe pre-eclampsia, antepartum

## 2021-08-05 NOTE — PROGRESS NOTE ADULT - ASSESSMENT
27yo PPD#2 s/p  c/w sPEC.  Patient is stable and doing well post-partum.     #sPEC  - s/p Mg (-)  - Procardia 60 XL  - s/p procardia 10 IR following delivery on 8/3 for severe range pressures  - blood pressure monitoring, improved since delivery  - cardio OB referral  - HELLP wn    #Well being  - Pain well controlled, continue current pain regimen  - Increase ambulation, SCDs when not ambulating  - Continue regular diet  - contraception: condoms    Dispo: discharge planning    Abimbola Balderas, PGY3

## 2021-08-05 NOTE — DISCHARGE NOTE OB - CARE PROVIDER_API CALL
[facility],   [address] [place] [id number]  Phone: [phone]  Fax: (   )    -  Follow Up Time:

## 2021-08-05 NOTE — DISCHARGE NOTE OB - PATIENT PORTAL LINK FT
You can access the FollowMyHealth Patient Portal offered by [facility] by registering at the following website: [email or web address]. By joining [employer]’s FollowMyHealth portal, you will also be able to view your health information using other applications (apps) compatible with our system.

## 2021-08-05 NOTE — DISCHARGE NOTE OB - PROVIDER TOKENS
FREE:[LAST:[[facility]],PHONE:[phone]],FAX:[(   )    -],ADDRESS:[[address] [place] [address]]]

## 2021-08-05 NOTE — DISCHARGE NOTE OB - INSTRUCTIONS
any increase in temp or bleeding call MD or go to Emergency Department PEC and PBWS reviewed with pt

## 2021-08-05 NOTE — DISCHARGE NOTE OB - HOSPITAL COURSE
25 y/o  who presented at 36w4d on  for evaluation for sPEC. The patient presented on  to the ED for follow-up of elevated ALT (30->50) and was found to have severe range blood pressures but left AMA. On admission, the patients blood pressure was 154/84 but denied headache and vision changes. The patient was admitted for IOL and started on Mg, procardia, and betamethasone. HELLP labs WNL. The patient delivered on 8/3 via  complicated by 2nd degree laceration. The patient's postpartum course was uncomplicated and BPs were well controlled on Procardia 60XL over 24hrs. The patient was discharged in stable condition to be followed by her OB at 6w postpartum. 25 y/o  who presented at 36w4d on  for evaluation for sPEC. The patient presented on  to the ED for follow-up of elevated ALT (30->50) and was found to have severe range blood pressures but left AMA. On admission, the patients blood pressure was 154/84 but denied headache and vision changes. The patient was admitted for IOL and started on Mg, procardia, and betamethasone. HELLP labs WNL. The patient delivered on 8/3 via  complicated by 2nd degree laceration. TXA and Hemabate were given following delivery (8/3) with hemostasis achieved and the patient required procardia 10 IR following delivery (8/3) for severe range pressures. The patient's postpartum course was uncomplicated and BPs were well controlled on Procardia 60XL over 24hrs. Magnesium was continued for 24 hours following delivery (-). The patient was discharged in stable condition with normal range BPs >24 hrs to be followed by her OB at 6w postpartum.   27 y/o  who presented at 36w4d on  for evaluation for sPEC. The patient presented on  to the ED for follow-up of elevated ALT (30->50) and was found to have severe range blood pressures but left AMA. On admission, the patients blood pressure was 154/84 but denied headache and vision changes. The patient was admitted for IOL and started on Mg, procardia, and betamethasone. HELLP labs WNL. The patient delivered on 8/3 via  complicated by 2nd degree laceration which was repaired complication. TXA and Hemabate were given following delivery (8/3) with hemostasis achieved and the patient required procardia 10 IR following delivery (8/3) for severe range pressures. The patient's postpartum course was uncomplicated and BPs were well controlled on Procardia 60XL over 24hrs. Magnesium was continued for 24 hours following delivery (-). The patient was discharged in stable condition with normal range BPs >24 hrs to be followed for blood pressure check and 1 week and by her OB at 6w postpartum.

## 2021-08-06 ENCOUNTER — APPOINTMENT (OUTPATIENT)
Dept: ANTEPARTUM | Facility: CLINIC | Age: 26
End: 2021-08-06

## 2021-08-06 DIAGNOSIS — O10.413 PRE-EXISTING SECONDARY HYPERTENSION COMPLICATING PREGNANCY, THIRD TRIMESTER: ICD-10-CM

## 2021-08-06 PROBLEM — O03.9 COMPLETE OR UNSPECIFIED SPONTANEOUS ABORTION WITHOUT COMPLICATION: Chronic | Status: ACTIVE | Noted: 2021-07-20

## 2021-08-06 RX ORDER — IBUPROFEN 200 MG
1 TABLET ORAL
Qty: 80 | Refills: 0
Start: 2021-08-06 | End: 2021-08-25

## 2021-08-06 RX ORDER — ACETAMINOPHEN 500 MG
3 TABLET ORAL
Qty: 126 | Refills: 0
Start: 2021-08-06 | End: 2021-08-19

## 2021-08-06 RX ORDER — NIFEDIPINE 30 MG
1 TABLET, EXTENDED RELEASE 24 HR ORAL
Qty: 42 | Refills: 0
Start: 2021-08-06 | End: 2021-09-16

## 2021-08-06 RX ORDER — NIFEDIPINE 30 MG
1 TABLET, EXTENDED RELEASE 24 HR ORAL
Qty: 40 | Refills: 0
Start: 2021-08-06 | End: 2021-09-14

## 2021-08-10 ENCOUNTER — APPOINTMENT (OUTPATIENT)
Dept: ANTEPARTUM | Facility: CLINIC | Age: 26
End: 2021-08-10

## 2021-08-11 ENCOUNTER — APPOINTMENT (OUTPATIENT)
Dept: CARDIOLOGY | Facility: CLINIC | Age: 26
End: 2021-08-11
Payer: MEDICAID

## 2021-08-11 DIAGNOSIS — Z78.9 OTHER SPECIFIED HEALTH STATUS: ICD-10-CM

## 2021-08-11 PROCEDURE — 99203 OFFICE O/P NEW LOW 30 MIN: CPT | Mod: 95

## 2021-08-11 RX ORDER — ACETAMINOPHEN 325 MG/1
325 TABLET, FILM COATED ORAL
Refills: 0 | Status: ACTIVE | COMMUNITY

## 2021-08-11 RX ORDER — IBUPROFEN 200 MG/1
TABLET, FILM COATED ORAL
Refills: 0 | Status: ACTIVE | COMMUNITY

## 2021-08-11 RX ORDER — PRENATAL VIT 49/IRON FUM/FOLIC 6.75-0.2MG
TABLET ORAL
Refills: 0 | Status: ACTIVE | COMMUNITY

## 2021-08-11 NOTE — DISCUSSION/SUMMARY
[FreeTextEntry1] : Ms. Tang is a 26 yr old F  presents in to establish care in the [facility] program. She is s/p  at 34+5 days found to have elevated LFT in the setting of HTN post partum and discharged home on Procardia 60mg which she has not taken ( feels its a  big dose to take) . of note, LFT's normalized upon discharge. She  denies chest pain, shortness of breath, dizziness, lightheadedness, palpitations or near syncope or syncope, orthopnea, PND and increasing lower extremity edema. But complaints of RUQ abdominal pain exacerbated by taking deep breaths. \par /76, 131/79, 132/63, 129/59, 124/80. \par \par 1) sPEC - BP labile 123- 132/ 59- 80\par Encouraged Patient to monitor BP at home and keep a log and report results back to us for evaluation. Based on results, we will adjust medications as necessary. \par Additionally, encouraged heart healthy diet and exercise as tolerated.\par  Check echocardiogram to evaluate wall motion and valvular function and r/o effusion \par Advised to call should she have complaints of chest pain, abdominal pain, visual disturbances, HA and blood pressure elevated >160/100 \par  Follow up in 6 weeks\par \par 2) RUQ Abd pain-  intermittent pain exacerbated by deep breaths  \par BP stable \par Advised to take tyelenol \par if pain worsens advised to call OB and go to ED\par \par f/u in 6- 8 weeks

## 2021-08-11 NOTE — REVIEW OF SYSTEMS
[Abdominal Pain] : abdominal pain [Negative] : Heme/Lymph [FreeTextEntry7] : RUQ exacerbated by breathing intermittently

## 2021-08-11 NOTE — HISTORY OF PRESENT ILLNESS
[Home] : at home, [unfilled] , at the time of the visit. [Other Location: e.g. Home (Enter Location, City,State)___] : at [unfilled] [Verbal consent obtained from patient] : the patient, [unfilled] [FreeTextEntry1] : Ms. Tang is a 26 yr old F  presents in to establish care in the [facility] program. She is s/p  at 34+5 days found to have elevated LFT in the setting of HTN post partum and discharged home on Procardia 60mg which she has not taken ( feels its a  big dose to take) . of note, LFT's normalized upon discharge. She  denies chest pain, shortness of breath, dizziness, lightheadedness, palpitations or near syncope or syncope, orthopnea, PND and increasing lower extremity edema. But complaints of RUQ abdominal pain exacerbated by taking deep breaths. \par /76, 131/79, 132/63, 129/59, 124/80. \par \par 1) sPEC - BP labile 123- 132/ 59- 80\par Encouraged Patient to monitor BP at home and keep a log and report results back to us for evaluation. Based on results, we will adjust medications as necessary. \par Additionally, encouraged heart healthy diet and exercise as tolerated.\par  Check echocardiogram to evaluate wall motion and valvular function \par Advised to call should she have complaints of chest pain, abdominal pain, visual disturbances, HA and blood pressure elevated >160/100 \par  Follow up in 6 weeks\par \par 2) RUQ Abd pain-  intermittent pain exacerbated by deep breaths  \par LFT's and BP stable ( AST- 37, ALT- 34, Alk phos 110) \par Advised to take Tylenol \par if pain worsens advised to call OB and go to ED\par \par \par

## 2021-08-12 ENCOUNTER — NON-APPOINTMENT (OUTPATIENT)
Age: 26
End: 2021-08-12

## 2021-08-13 ENCOUNTER — APPOINTMENT (OUTPATIENT)
Dept: ANTEPARTUM | Facility: CLINIC | Age: 26
End: 2021-08-13

## 2021-08-13 ENCOUNTER — APPOINTMENT (OUTPATIENT)
Dept: MATERNAL FETAL MEDICINE | Facility: CLINIC | Age: 26
End: 2021-08-13

## 2021-09-05 LAB — SURGICAL PATHOLOGY STUDY: SIGNIFICANT CHANGE UP

## 2021-09-07 ENCOUNTER — APPOINTMENT (OUTPATIENT)
Dept: CV DIAGNOSITCS | Facility: HOSPITAL | Age: 26
End: 2021-09-07

## 2021-09-17 ENCOUNTER — APPOINTMENT (OUTPATIENT)
Dept: MATERNAL FETAL MEDICINE | Facility: CLINIC | Age: 26
End: 2021-09-17

## 2021-09-17 ENCOUNTER — APPOINTMENT (OUTPATIENT)
Dept: MATERNAL FETAL MEDICINE | Facility: CLINIC | Age: 26
End: 2021-09-17
Payer: MEDICAID

## 2021-09-17 VITALS
BODY MASS INDEX: 31.89 KG/M2 | OXYGEN SATURATION: 99 % | HEIGHT: 63 IN | WEIGHT: 180 LBS | SYSTOLIC BLOOD PRESSURE: 110 MMHG | DIASTOLIC BLOOD PRESSURE: 70 MMHG | HEART RATE: 88 BPM

## 2021-09-17 DIAGNOSIS — O09.90 SUPERVISION OF HIGH RISK PREGNANCY, UNSPECIFIED, UNSPECIFIED TRIMESTER: ICD-10-CM

## 2021-09-17 PROCEDURE — 99213 OFFICE O/P EST LOW 20 MIN: CPT | Mod: GC

## 2021-09-22 NOTE — HISTORY OF PRESENT ILLNESS
[Postpartum Follow Up] : postpartum follow up [Complications:___] : complications include: [unfilled] [Delivery Date: ___] : on [unfilled] [] : delivered by vaginal delivery [Breastfeeding] : currently nursing [Breast Pain] : breast pain [Cracked Nipples] : cracked nipples [Normal] : the vagina was normal [Healing Well] : is healing well [Examination Of The Breasts] : breasts are normal [Doing Well] : is doing well [No Sign of Infection] : is showing no signs of infection [None] : None [BF with Difficulty] : nursing without difficulty [Resumed Menses] : has not resumed her menses [Resumed [place]] : has not resumed intercourse [Intended Contraception] : the patient does not intended to use contraception postpartum [Abdominal Pain] : no abdominal pain [Back Pain] : no back pain [BreastFeeding Problems] : no breastfeeding problems [Chest Pain] : no chest pain [Episiotomy Site Pain] : no episiotomy site pain [Heavy Bleeding] : no heavy bleeding [Shortness of Breath] : no shortness of breath [Vaginal Discharge] : no vaginal discharge [FreeTextEntry8] : P

## 2021-10-07 ENCOUNTER — APPOINTMENT (OUTPATIENT)
Dept: CARDIOLOGY | Facility: CLINIC | Age: 26
End: 2021-10-07

## 2021-10-19 ENCOUNTER — APPOINTMENT (OUTPATIENT)
Dept: CARDIOLOGY | Facility: CLINIC | Age: 26
End: 2021-10-19

## 2021-11-16 ENCOUNTER — APPOINTMENT (OUTPATIENT)
Dept: CARDIOLOGY | Facility: CLINIC | Age: 26
End: 2021-11-16

## 2021-11-18 DIAGNOSIS — O10.413 PRE-EXISTING SECONDARY HYPERTENSION COMPLICATING PREGNANCY, THIRD TRIMESTER: ICD-10-CM

## 2021-11-18 DIAGNOSIS — O13.3 GESTATIONAL [PREGNANCY-INDUCED] HYPERTENSION WITHOUT SIGNIFICANT PROTEINURIA, THIRD TRIMESTER: ICD-10-CM

## 2021-12-16 ENCOUNTER — APPOINTMENT (OUTPATIENT)
Dept: CARDIOLOGY | Facility: CLINIC | Age: 26
End: 2021-12-16
Payer: MEDICAID

## 2021-12-16 DIAGNOSIS — R01.1 CARDIAC MURMUR, UNSPECIFIED: ICD-10-CM

## 2021-12-16 DIAGNOSIS — O13.9 GESTATIONAL [PREGNANCY-INDUCED] HYPERTENSION W/OUT SIGNIFICANT PROTEINURIA, UNSPECIFIED TRIMESTER: ICD-10-CM

## 2021-12-16 PROCEDURE — 93306 TTE W/DOPPLER COMPLETE: CPT

## 2021-12-20 ENCOUNTER — NON-APPOINTMENT (OUTPATIENT)
Age: 26
End: 2021-12-20

## 2021-12-20 DIAGNOSIS — Z71.2 PERSON CONSULTING FOR EXPLANATION OF EXAMINATION OR TEST FINDINGS: ICD-10-CM

## 2021-12-27 ENCOUNTER — TRANSCRIPTION ENCOUNTER (OUTPATIENT)
Age: 26
End: 2021-12-27

## 2021-12-29 ENCOUNTER — EMERGENCY (EMERGENCY)
Facility: HOSPITAL | Age: 26
LOS: 1 days | Discharge: HOME | End: 2021-12-29
Attending: STUDENT IN AN ORGANIZED HEALTH CARE EDUCATION/TRAINING PROGRAM
Payer: MEDICAID

## 2021-12-29 VITALS
HEIGHT: 63 IN | DIASTOLIC BLOOD PRESSURE: 77 MMHG | WEIGHT: 181 LBS | OXYGEN SATURATION: 98 % | TEMPERATURE: 98 F | SYSTOLIC BLOOD PRESSURE: 127 MMHG | RESPIRATION RATE: 20 BRPM | HEART RATE: 79 BPM

## 2021-12-29 VITALS
OXYGEN SATURATION: 99 % | HEART RATE: 80 BPM | TEMPERATURE: 98 F | SYSTOLIC BLOOD PRESSURE: 122 MMHG | RESPIRATION RATE: 18 BRPM | DIASTOLIC BLOOD PRESSURE: 78 MMHG

## 2021-12-29 DIAGNOSIS — Z98.890 OTHER SPECIFIED POSTPROCEDURAL STATES: Chronic | ICD-10-CM

## 2021-12-29 PROCEDURE — 99283 EMERGENCY DEPT VISIT LOW MDM: CPT

## 2021-12-29 PROCEDURE — 87637 SARSCOV2&INF A&B&RSV AMP PRB: CPT

## 2021-12-29 PROCEDURE — 99284 EMERGENCY DEPT VISIT MOD MDM: CPT

## 2021-12-29 NOTE — ED ADULT NURSE NOTE - OBJECTIVE STATEMENT
pt has had covid for 5 days  she "feels better" but is here with  a fever at home   here she is afebrile and appears well with clear lungs

## 2021-12-29 NOTE — ED PROVIDER NOTE - ATTENDING CONTRIBUTION TO CARE
Attending Blanca: I performed a history and physical exam of the patient and discussed their management with the resident/fellow/ACP/student. I have reviewed the resident/fellow/ACP/student note and agree with the documented findings and plan of care, except as noted. I have personally performed a substantive portion of the visit including all aspects of the medical decision making. My medical decision making and observations are found above. Please refer to any progress notes for updates on clinical course.

## 2021-12-29 NOTE — ED PROVIDER NOTE - OBJECTIVE STATEMENT
25 y/o female presenting for 3-4 days of body aches and low grade fever. tmax 100. reports nausea without vomiting. denies cough/congestion/sore throat/abd pain/urinary complaints or breast. not vaccinated against covid.

## 2021-12-29 NOTE — ED PROVIDER NOTE - CARDIAC, MLM
Normal rate, regular rhythm.  Heart sounds S1, S2.  No murmurs, rubs or gallops. no breast rash/tenderness/drainage

## 2021-12-29 NOTE — ED PROVIDER NOTE - CLINICAL SUMMARY MEDICAL DECISION MAKING FREE TEXT BOX
Attending Gaviotao: 27 y/o F w/ no sig PMH presenting w/ fever. Seen in pink. Reports for the past approx week having fevers w/ body aches. Has had nausea but no vomiting. +Fatigue as well. Denies cough, congestion, shortness of breath. Is not vaccinated against covid. No known sick contacts. Eating and drinking like normal. Pt well appearing, no acute distress. Lungs clear. HR regular. Abd nondistended/soft/nontender. Posterior oropharynx normal appearing. Given symptoms, suspect likely viral illness. High suspicion for covid given prevalence and unvaccinated status. Will send swab and likely DC w/ expectant home management. Pt amenable w/ this plan. Will reassess the need for additional interventions as clinically warranted.

## 2021-12-29 NOTE — ED PROVIDER NOTE - NSFOLLOWUPINSTRUCTIONS_ED_ALL_ED_FT
You were tested for covid  Your oxygen levels in the emergency department are normal.   There is no indication for antibiotics at this time    Patients with COVID can have symptoms for up to 2 weeks and patients with "long covid" report symptoms for months.    Please stay well hydrated.  Continue taking Tylenol as directed as needed for fever.    *CONSIDER GETTING VACCINATED WHEN YOU RECOVER FROM THIS INFECTION*  The COVID vaccines are FDA approved and have been proven to decrease the risk of severe disease from COVID.     Follow-up with your primary doctor within 1-2 days  Bring a copy of your results with you  Return to an ER for worsening symptoms or any other concerns.

## 2021-12-29 NOTE — ED PROVIDER NOTE - NSICDXPASTMEDICALHX_GEN_ALL_CORE_FT
PAST MEDICAL HISTORY:   D&C    No pertinent past medical history     No pertinent past medical history

## 2021-12-29 NOTE — ED PROVIDER NOTE - PATIENT PORTAL LINK FT
You can access the FollowMyHealth Patient Portal offered by [facility] by registering at the following website: [email or web address]. By joining [facility]’s FollowMyHealth portal, you will also be able to view your health information using other applications (apps) compatible with our system.

## 2021-12-29 NOTE — ED PROVIDER NOTE - NSICDXPASTSURGICALHX_GEN_ALL_CORE_FT
PAST SURGICAL HISTORY:       H/O shoulder surgery right side    S/P plastic surgery L hand surgery

## 2021-12-30 LAB
FLUAV AG NPH QL: SIGNIFICANT CHANGE UP
FLUBV AG NPH QL: SIGNIFICANT CHANGE UP
RSV RNA NPH QL NAA+NON-PROBE: SIGNIFICANT CHANGE UP
SARS-COV-2 RNA SPEC QL NAA+PROBE: DETECTED

## 2021-12-30 NOTE — ED POST DISCHARGE NOTE - DETAILS
12/30/21: LM to callback admin line for results. Idania Pizarro PA-C 12/30/21: Spoke with pt regarding results. Aware she is COVID pos. Advised rest and adequate hydration. Tylenol/Motrin prn for fever. Return to ED instructiosn reviewed, verbalized understanding. Isolation/quarantine per CDC protocol. Idania Pizaror PA-C

## 2022-01-21 PROBLEM — R01.1 MURMUR: Status: ACTIVE | Noted: 2022-01-21

## 2022-01-21 PROBLEM — O13.9 GESTATIONAL HTN: Status: ACTIVE | Noted: 2021-07-30

## 2023-11-29 ENCOUNTER — NON-APPOINTMENT (OUTPATIENT)
Age: 28
End: 2023-11-29

## 2023-12-05 ENCOUNTER — NON-APPOINTMENT (OUTPATIENT)
Age: 28
End: 2023-12-05

## 2024-01-31 NOTE — OB RN DELIVERY SUMMARY - NS_INTRAPARTUMABXTYPE_OBGYN_ALL_OB
ondansetron (ZOFRAN ODT) 8 MG ODT tab 30 tablet 3 10/24/2023     Last Office Visit: 1/30/24  Future Office visit:   4/30/24    Refill protocol passed  Karolyn Quintana RN    
No antibiotics or any antibiotics < 2 hrs prior to birth

## 2024-02-16 ENCOUNTER — EMERGENCY (EMERGENCY)
Facility: HOSPITAL | Age: 29
LOS: 1 days | Discharge: LEFT BEFORE DISCHARGE | End: 2024-02-16
Attending: EMERGENCY MEDICINE | Admitting: EMERGENCY MEDICINE
Payer: MEDICAID

## 2024-02-16 VITALS
HEART RATE: 66 BPM | DIASTOLIC BLOOD PRESSURE: 70 MMHG | OXYGEN SATURATION: 99 % | SYSTOLIC BLOOD PRESSURE: 120 MMHG | TEMPERATURE: 98 F | RESPIRATION RATE: 16 BRPM

## 2024-02-16 DIAGNOSIS — Z98.890 OTHER SPECIFIED POSTPROCEDURAL STATES: Chronic | ICD-10-CM

## 2024-02-16 PROCEDURE — 73562 X-RAY EXAM OF KNEE 3: CPT | Mod: 26,RT

## 2024-02-16 PROCEDURE — 99283 EMERGENCY DEPT VISIT LOW MDM: CPT

## 2024-02-16 PROCEDURE — 73590 X-RAY EXAM OF LOWER LEG: CPT | Mod: 26,RT

## 2024-02-16 RX ORDER — ACETAMINOPHEN 500 MG
650 TABLET ORAL ONCE
Refills: 0 | Status: COMPLETED | OUTPATIENT
Start: 2024-02-16 | End: 2024-02-16

## 2024-02-16 RX ORDER — ACETAMINOPHEN 500 MG
2 TABLET ORAL
Qty: 24 | Refills: 0
Start: 2024-02-16 | End: 2024-02-19

## 2024-02-16 RX ADMIN — Medication 650 MILLIGRAM(S): at 15:42

## 2024-02-16 NOTE — ED PROVIDER NOTE - PROGRESS NOTE DETAILS
Patient ambulatory without any difficulty.  Returned from x-ray and is walking out.  States "I had to  my child from  and I cannot believe it" explained to patient that x-rays not even uploaded I have not seen it in actors no report on the x-ray.  Patient aware that she will be walking out without being discharged.  Patient does not want to wait.  Acknowledges the x-ray was not reviewed or resulted.  Patient requested Tylenol be sent to the pharmacy Tylenol prescription was ordered.  Conversation witnessed by CAMILA Dowd.Pt AO3 verbalized understanding Patient ambulatory without any difficulty.  Returned from x-ray and is walking out.  States "I had to  my child from  and I cannot be late" explained to patient that x-rays not even uploaded I have not reviewed it, there is no report on the x-ray.  Patient aware that she will be walking out without being discharged.  Patient does not want to wait.  Acknowledges the x-ray was not reviewed or resulted.  Patient requested Tylenol be sent to the pharmacy Tylenol prescription was ordered.  Conversation witnessed by CAMILA Dowd.Pt AO3 verbalized understanding

## 2024-02-16 NOTE — ED PROVIDER NOTE - NSFOLLOWUPINSTRUCTIONS_ED_ALL_ED_FT
Knee Pain  WHAT YOU NEED TO KNOW:  Knee pain may start suddenly, or it may be a long-term problem. You may have pain on the side, front, or back of your knee. You may have knee stiffness and swelling. You may hear popping sounds or feel like your knee is giving way or locking up as you walk. You may feel pain when you sit, stand, walk, or climb up and down stairs. Knee pain can be caused by conditions such as obesity, inflammation, or strains or tears in ligaments or tendons.   DISCHARGE INSTRUCTIONS:  Return to the emergency department if:   •Your pain is worse, even after treatment.   •You cannot bend or straighten your leg completely.   •The swelling around your knee does not go down even with treatment.  •Your knee is painful and hot to the touch.   Contact your healthcare provider if:   •You have questions or concerns about your condition or care.   Medicines: You may need any of the following:   •NSAIDs help decrease swelling and pain or fever. This medicine is available with or without a doctor's order. NSAIDs can cause stomach bleeding or kidney problems in certain people. If you take blood thinner medicine, always ask your healthcare provider if NSAIDs are safe for you. Always read the medicine label and follow directions.  •Acetaminophen decreases pain and fever. It is available without a doctor's order. Ask how much to take and how often to take it. Follow directions. Read the labels of all other medicines you are using to see if they also contain acetaminophen, or ask your doctor or pharmacist. Acetaminophen can cause liver damage if not taken correctly. Do not use more than 4 grams (4,000 milligrams) total of acetaminophen in one day.   •Prescription pain medicine may be given. Ask your healthcare provider how to take this medicine safely. Some prescription pain medicines contain acetaminophen. Do not take other medicines that contain acetaminophen without talking to your healthcare provider. Too much acetaminophen may cause liver damage. Prescription pain medicine may cause constipation. Ask your healthcare provider how to prevent or treat constipation.   •Take your medicine as directed. Contact your healthcare provider if you think your medicine is not helping or if you have side effects. Tell him or her if you are allergic to any medicine. Keep a list of the medicines, vitamins, and herbs you take. Include the amounts, and when and why you take them. Bring the list or the pill bottles to follow-up visits. Carry your medicine list with you in case of an emergency.  What you can do to manage your symptoms:   •Rest your knee so it can heal. Limit activities that increase your pain. Do low-impact exercises, such as walking or swimming.   •Apply ice to help reduce swelling and pain. Use an ice pack, or put crushed ice in a plastic bag. Cover it with a towel before you apply it to your knee. Apply ice for 15 to 20 minutes every hour, or as directed.  •Apply compression to help reduce swelling. Use a brace or bandage only as directed.  •Elevate your knee to help decrease pain and swelling. Elevate your knee while you are sitting or lying down. Prop your leg on pillows to keep your knee above the level of your heart.  •Prevent your knee from moving as directed. Your healthcare provider may put on a cast or splint. You may need to wear a leg brace to stabilize your knee. A leg brace can be adjusted to increase your range of motion as your knee heals.  Hinged Knee Braces    What you can do to prevent knee pain:   •Maintain a healthy weight. Extra weight increases your risk for knee pain. Ask your healthcare provider how much you should weigh. He or she can help you create a safe weight loss plan if you need to lose weight.  •Exercise or train properly. Use the correct equipment for sports. Wear shoes that provide good support. Check your posture often as you exercise, play sports, or train for an event. This can help prevent stress and strain on your knees. Rest between sessions so you do not overwork your knees.  Follow up with your healthcare provider within 24 hours or as directed: You may need follow-up treatments, such as steroid injections to decrease pain. Write down your questions so you remember to ask them during your visits.   Dolor de rodilla  LO QUE NECESITA SABER:  El dolor de rodilla puede empezar de forma repentina, o ser un problema a kg plazo. Puede sentir dolor en la parte lateral, delantera o trasera de la rodilla. Puede tener la rodilla rígida e hinchada. Puede oír sonidos de chasquido o sentir que la rodilla cede o se le bloquea al andar. Puede sentir dolor cuando se sienta, se pone de pie, camina o sube y baja escaleras. El dolor de rodilla puede estar provocado por condiciones antonia obesidad, inflamación, esguinces o desgarros de los ligamentos o los tendones.  INSTRUCCIONES SOBRE EL FELICIA HOSPITALARIA:  Regrese a la sukumar de emergencias si:  •El dolor empeora, incluso después del tratamiento.  •No puede flexionar o enderezar la pierna completamente.  •La hinchazón alrededor de la rodilla no disminuye a pesar del tratamiento.  •La rodilla le duele y se nota caliente al tacto.  Comuníquese con argueta médico si:  •Usted tiene preguntas o inquietudes acerca de argueta condición o cuidado.  Medicamentos:Es posible que usted necesite alguno de los siguientes:   •Los BHAVIN,pueden disminuir la inflamación y el dolor o la fiebre. Chrissy medicamento está disponible con o sin mary receta médica. Los BHAVIN pueden causar sangrado estomacal o problemas renales en ciertas personas. Si usted nacho un medicamento anticoagulante, siempre pregúntele a argueta médico si los BHAVIN son seguros para usted. Siempre fernie la etiqueta de chrissy medicamento y siga las instrucciones.  •Acetaminofénalivia el dolor y baja la fiebre. Está disponible sin receta médica. Pregunte la cantidad y la frecuencia con que debe tomarlos. Siga las indicaciones. Fernie las etiquetas de todos los demás medicamentos que esté usando para saber si también contienen acetaminofén, o pregunte a argueta médico o farmacéutico. El acetaminofén puede causar daño en el hígado cuando no se nacho de forma correcta. No use más de 4 gramos (4000 miligramos) en total de acetaminofeno en un día.  •Puede administrarsepodrían administrarse. Pregunte al médico cómo debe honey chrissy medicamento de forma boo. Algunos medicamentos recetados para el dolor contienen acetaminofén. No tome otros medicamentos que contengan acetaminofén sin consultarlo con argueta médico. Demasiado acetaminofeno puede causar daño al hígado. Los medicamentos recetados para el dolor podrían causar estreñimiento. Pregunte a argueta médico antonia prevenir o tratar estreñimiento.  •[place] jonas medicamentos antonia se le haya indicado.Consulte con argueta médico si usted asm que argueta medicamento no le está ayudando o si presenta efectos secundarios. Infórmele si es alérgico a cualquier medicamento. Mantenga mary lista actualizada de los medicamentos, las vitaminas y los productos herbales que nacho. Incluya los siguientes datos de los medicamentos: cantidad, frecuencia y motivo de administración. Traiga con usted la lista o los envases de las píldoras a jonas citas de seguimiento. Lleve la lista de los medicamentos con usted en jovnana de mary emergencia.  Lo que puede hacer para manejar los síntomas:  •Repose la rodilla para que se pueda curar.Limite las actividades que aumenten el dolor. Adelina ejercicios de bajo impacto, antonia caminar o nadar.  •Aplique hielo para ayudar a disminuir la inflamación y el dolor.Use mary compresa de hielo o ponga hielo triturado en mary bolsa de plástico. Cúbrala con mary toalla antes de aplicarla sobre la herida. Aplique hielo tomi 15 a 20 minutos por hora o según indicaciones.  •Aplique compresión para ayudar a reducir la inflamación.Use mary férula o venda solamente si sigue las instrucciones del jovanna.  •Eleve la rodilla para ayudar a disminuir el dolor y la inflamación.Eleve la rodilla mientras esté sentado o acostado. Apoye la pierna sobre almohadones para mantener la rodilla por encima del corazón.  •Evite que la rodilla se mueva, antonia se le haya indicado.Argueta médico podría ponerle un yeso o férula. Usted podría necesitar de un yeso en argueta pierna para estabilizar argueta rodilla. El yeso en la pierna puede ajustarse para aumentar argueta rango de movimiento a medida que argueta rodilla que.  Rodillera articulada   Lo que puede hacer para prevenir el dolor de rodilla:  •Mantenga un peso saludable.El exceso de peso aumenta argueta riesgo de sufrir dolor de rodilla. Consulte con argueta médico cuánto debería pesar. Él puede ayudarlo a crear un plan de pérdida de peso seguro si usted tiene sobrepeso.  •Adelina ejercicio o entrene correctamente.Utilice los aparatos adecuados para los deportes. Use zapatos que brinden un buen soporte. Verifique argueta postura a menudo mientras hace ejercicio, juega deportes o entrena para un evento. [place] puede ayudar a prevenir el estrés y la tensión en las rodillas. Descanse entre sesiones para no esforzar excesivamente las rodillas.  Acuda en 24 horas a mary sandro de seguimiento con argueta médico o antonia se le indique:Quizá necesite tratamientos de seguimiento, antonia inyecciones de esteroides para reducir el dolor. Anote jonas preguntas para que se acuerde de hacerlas tomi jonas visitas.

## 2024-02-16 NOTE — ED PROVIDER NOTE - PHYSICAL EXAMINATION
Dr Meyers  nontoxic female. mmm. Ao3 no facial or head trauma. supple neck. nl ROM  normal S1-S2 nontender chest wall.   No resp distress. able to speak in full and clear sentences. no wheeze, rales or stridor. pulse ox  99  soft nontender abdomen. no  rebound. no guarding. no sign of trauma. no CVAT   no tender midline cervical/thoracic or lumbar spine. no step off. no ecchymosis or bruise of chest/back or abdomen.   stable pelvis. nl rom of bl hips/knees.   neg straight leg bl. nl rom. nl gait. nl sensation  of lower ext bl. no laxity of bl knees. tender to the right knee no deformity  no calf tenderness bl

## 2024-02-16 NOTE — ED ADULT NURSE NOTE - OBJECTIVE STATEMENT
pt received to wellness. A&Ox4, amb at baseline .denies pmh. pt c/o lower back pain and bilateral knee pain s/o mechanical fall x1 week ago. denies LOC, AC use or head trauma. has been taking tylenol with partial relief for pain. here for further eval. able to ambulate with steady gait. safety maintained

## 2024-02-16 NOTE — ED ADULT NURSE NOTE - NSFALLUNIVINTERV_ED_ALL_ED
Bed/Stretcher in lowest position, wheels locked, appropriate side rails in place/Call bell, personal items and telephone in reach/Instruct patient to call for assistance before getting out of bed/chair/stretcher/Non-slip footwear applied when patient is off stretcher/[place] to call system/Physically safe environment - no spills, clutter or unnecessary equipment/Purposeful proactive rounding/Room/bathroom lighting operational, light cord in reach

## 2024-02-16 NOTE — ED PROVIDER NOTE - OBJECTIVE STATEMENT
Dr Meyers  28-year-old female denies any past medical history presents with lower back pain and knee pain status post fall a week ago.  Patient states she tripped and fell forward in her apartment last Sunday (6 days ago).  No head trauma no LOC was not dizzy or lightheaded before or after the fall she tripped and fell forward.  Was taking Tylenol close couple days because he was on her menses and was having some abdominal cramping and was doing well.  Today she went to work did not take it before pain started having lower back pain and bilateral knee pain right greater than left.  Prompted ER visit.  Has no headache no neck pain no chest pain no abdominal pain no nausea no vomiting no numbness no weakness no urine or bowel incontinence no saddle anesthesia.  Does not take blood thinners and denies pregnancy.

## 2024-02-16 NOTE — ED ADULT TRIAGE NOTE - CHIEF COMPLAINT QUOTE
Pt presents for lower back pain and pain to both knees for the past 6 days related to fall, pt denies hitting head at that time.

## 2024-07-25 ENCOUNTER — NON-APPOINTMENT (OUTPATIENT)
Age: 29
End: 2024-07-25

## 2024-09-09 ENCOUNTER — NON-APPOINTMENT (OUTPATIENT)
Age: 29
End: 2024-09-09

## 2024-09-11 ENCOUNTER — EMERGENCY (EMERGENCY)
Facility: HOSPITAL | Age: 29
LOS: 0 days | Discharge: HOME | End: 2024-09-11
Payer: MEDICAID

## 2024-09-11 VITALS
HEART RATE: 76 BPM | DIASTOLIC BLOOD PRESSURE: 71 MMHG | RESPIRATION RATE: 18 BRPM | OXYGEN SATURATION: 100 % | TEMPERATURE: 99 F | SYSTOLIC BLOOD PRESSURE: 113 MMHG

## 2024-09-11 VITALS
DIASTOLIC BLOOD PRESSURE: 77 MMHG | HEIGHT: 65 IN | SYSTOLIC BLOOD PRESSURE: 118 MMHG | WEIGHT: 164.91 LBS | RESPIRATION RATE: 19 BRPM | TEMPERATURE: 99 F | HEART RATE: 68 BPM | OXYGEN SATURATION: 99 %

## 2024-09-11 DIAGNOSIS — M25.471 EFFUSION, RIGHT ANKLE: ICD-10-CM

## 2024-09-11 DIAGNOSIS — Z98.890 OTHER SPECIFIED POSTPROCEDURAL STATES: Chronic | ICD-10-CM
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
## 2024-09-11 DIAGNOSIS — M25.561 PAIN IN RIGHT KNEE: ICD-10-CM
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
## 2024-09-11 DIAGNOSIS — M25.551 PAIN IN RIGHT HIP: ICD-10-CM
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
## 2024-09-11 DIAGNOSIS — M25.571 PAIN IN RIGHT ANKLE AND JOINTS OF RIGHT FOOT: ICD-10-CM

## 2024-09-11 PROCEDURE — 99284 EMERGENCY DEPT VISIT MOD MDM: CPT

## 2024-09-11 PROCEDURE — 73610 X-RAY EXAM OF ANKLE: CPT | Mod: 26,RT

## 2024-09-11 PROCEDURE — 73502 X-RAY EXAM HIP UNI 2-3 VIEWS: CPT | Mod: 26,RT

## 2024-09-11 PROCEDURE — 73562 X-RAY EXAM OF KNEE 3: CPT | Mod: 26,RT

## 2024-09-11 RX ORDER — ACETAMINOPHEN 325 MG/1
650 TABLET ORAL ONCE
Refills: 0 | Status: COMPLETED | OUTPATIENT
Start: 2024-09-11 | End: 2024-09-11

## 2024-09-11 RX ORDER — IBUPROFEN 600 MG
600 TABLET ORAL ONCE
Refills: 0 | Status: COMPLETED | OUTPATIENT
Start: 2024-09-11 | End: 2024-09-11

## 2024-09-11 RX ADMIN — Medication 600 MILLIGRAM(S): at 14:10

## 2024-09-11 RX ADMIN — ACETAMINOPHEN 650 MILLIGRAM(S): 325 TABLET ORAL at 14:11

## 2024-09-11 NOTE — ED PROVIDER NOTE - CARE PLAN
Principal Discharge DX:	Hip pain, right  Secondary Diagnosis:	Knee pain, right  Secondary Diagnosis:	Ankle pain, right  Secondary Diagnosis:	Fall   1

## 2024-09-11 NOTE — ED PROVIDER NOTE - CLINICAL SUMMARY MEDICAL DECISION MAKING FREE TEXT BOX
29y Female with no significant past medical history, no chance of pregnancy presents to the ER for knee pain/injury. Patient states she was walking by work when she stepped into a pothole that was deeper than expected, landing on bilateral knees, twisting R ankle and feeling a pull in R hip. States she is able to ambulate but actively having the worst pain in R hip. Denies hitting head, LOC, numbness, tingling, abrasion, laceration. Vital signs stable, Mild tenderness to R hip with full ROM, no reproducible pain or swelling to  R knee, R ankle with minimal swelling and tenderness, full ROM. Concern for MSK pain/strain vs contusion, low suspicion for fracture or dislocation. Will get XR, meds, reassess. Likely dc with pcp follow up and supportive care.

## 2024-09-11 NOTE — ED PROVIDER NOTE - NSFOLLOWUPINSTRUCTIONS_ED_ALL_ED_FT
Today you were seen in the ER for knee pain, ankle pain and hip pain.     Take Motrin 600 mg every 8 hours as needed for moderate pain-- take with food..    Take Tylenol 650mg (Two 325 mg pills) every 4-6 hours as needed for pain.    Rest, Ice, Elevate injured area    Wear ace wrap as discussed.     Follow-up with Orthopedics if symptoms persist, See referred doctor. Call today / next business day for close, prompt follow-up.    Return to Emergency room for any worsening or persistent pain, weakness, numbness, fever, color change to extremity, or any other concerning symptoms.    Advance activity as tolerated.     Continue all previously prescribed medications as directed unless otherwise instructed.     Follow up with your primary care physician in 48-72 hours- bring copies of your results. Today you were seen in the ER for knee pain, ankle pain and hip pain.     Take Motrin 600 mg every 8 hours as needed for moderate pain-- take with food..    Take Tylenol 650mg (Two 325 mg pills) every 4-6 hours as needed for pain.    Rest, Ice, Elevate injured area    Follow-up with Orthopedics if symptoms persist, See referred doctor. Call today / next business day for close, prompt follow-up.    Return to Emergency room for any worsening or persistent pain, weakness, numbness, fever, color change to extremity, or any other concerning symptoms.    Advance activity as tolerated.     Continue all previously prescribed medications as directed unless otherwise instructed.     Follow up with your primary care physician in 48-72 hours- bring copies of your results.

## 2024-09-11 NOTE — ED ADULT NURSE NOTE - OBJECTIVE STATEMENT
29 y.o female, A&Ox4, c/o bilateral knee pain, right ankle pain and right hip pain. As per pt, she was walking across the street when she stepped into a pot hole. pt states "my foot went in and my ankle twisted, and then I felt a jerking movement and hurt my right hip". pt states she fell onto her hands and knees. pt states bilateral knee pain. Patient denies: any sob, difficulty breathing, dizziness, headache, vision changes, cp, palpations, abdominal pain, n/v/d, fever, chills, numbness, tingling, urinary symptoms, LE swelling. pt states taking Tylenol unknown mg at 9:30 am. pt on menstrual cycle presently. no swelling noted at knees.   LMP: 9/9/24

## 2024-09-11 NOTE — ED PROVIDER NOTE - LOWER EXTREMITY EXAM, RIGHT
Mild tenderness to R hip with full ROM, no reproducible pain or swelling to  R knee, R ankle with minimal swelling and tenderness, full ROM

## 2024-09-11 NOTE — ED PROVIDER NOTE - OBJECTIVE STATEMENT
29y Female with no significant past medical history, no chance of pregnancy presents to the ER for knee pain/injury. Patient states she was walking by work when she stepped into a pothole that was deeper than expected, landing on bilateral knees, twisting R ankle and feeling a pull in R hip. States she is able to ambulate but actively having the worst pain in R hip. Denies hitting head, LOC, numbness, tingling, abrasion, laceration.

## 2024-09-11 NOTE — ED ADULT TRIAGE NOTE - CHIEF COMPLAINT QUOTE
Patient BIBA presents to ED c/o right lower back pain and right knee pain after fall. Denies head injury or LOC LMP 9/9/24  no pmh

## 2024-09-11 NOTE — ED PROVIDER NOTE - PATIENT PORTAL LINK FT
You can access the FollowMyHealth Patient Portal offered by [facility] by registering at the following website: [email or web address]. By joining [facility]’s FollowMyHealth portal, you will also be able to view your health information using other applications (apps) compatible with our system.

## 2024-09-11 NOTE — ED PROVIDER NOTE - CARE PROVIDER_API CALL
Олег Gill.  Orthopaedic Surgery  [address] [facility], [address] [place] [id number]  Phone: [phone]  Fax: [phone]  Follow Up Time:

## 2024-11-12 NOTE — ED PROVIDER NOTE - CARE PROVIDERS DIRECT ADDRESSES
Called patient and scheduled appointment w/ Dr. Myrick per Summer Patel via task. -KB   ,DirectAddress_Unknown

## 2025-04-01 NOTE — ED PROVIDER NOTE - PMH
Called patient in regard to scheduling her Lequivo injection nurse visit. Patient stated that her daughter brings her to her appointments and she has a vacation coming up but is unsure on what the dates are. Patient stated she would call her daughter and clarify and call back to schedule. Upon return call please schedule a nurse visit around 5/25/25. Notes: 4th Lequivo injection. Please follow up in a few days for scheduling if call back has not been placed.   No pertinent past medical history    No pertinent past medical history

## 2025-04-14 ENCOUNTER — NON-APPOINTMENT (OUTPATIENT)
Age: 30
End: 2025-04-14

## 2025-07-11 NOTE — ED ADULT TRIAGE NOTE - PAIN RATING/NUMBER SCALE (0-10): REST
Follow up 2 weeks post CT scan    Continue current medications and therapy for chronic medical conditions.    Patient was advised importance of proper diet/nutrition in addition adequate hydration. Patient was encouraged moderate exercise program to include 30 minutes daily for 5 days of the week or 150 minutes weekly. Patient will follow-up with us as scheduled.     
4